# Patient Record
Sex: FEMALE | Race: WHITE | NOT HISPANIC OR LATINO | URBAN - METROPOLITAN AREA
[De-identification: names, ages, dates, MRNs, and addresses within clinical notes are randomized per-mention and may not be internally consistent; named-entity substitution may affect disease eponyms.]

---

## 2018-12-26 ENCOUNTER — EMERGENCY (EMERGENCY)
Facility: HOSPITAL | Age: 78
LOS: 1 days | Discharge: HOME | End: 2018-12-26
Attending: EMERGENCY MEDICINE | Admitting: EMERGENCY MEDICINE
Payer: COMMERCIAL

## 2018-12-26 VITALS
DIASTOLIC BLOOD PRESSURE: 95 MMHG | SYSTOLIC BLOOD PRESSURE: 150 MMHG | RESPIRATION RATE: 15 BRPM | HEART RATE: 81 BPM | HEIGHT: 66 IN | WEIGHT: 149.91 LBS | TEMPERATURE: 98 F | OXYGEN SATURATION: 97 %

## 2018-12-26 DIAGNOSIS — Z98.89 OTHER SPECIFIED POSTPROCEDURAL STATES: Chronic | ICD-10-CM

## 2018-12-26 PROCEDURE — 99284 EMERGENCY DEPT VISIT MOD MDM: CPT

## 2018-12-26 PROCEDURE — 72040 X-RAY EXAM NECK SPINE 2-3 VW: CPT | Mod: 26

## 2018-12-26 PROCEDURE — 96372 THER/PROPH/DIAG INJ SC/IM: CPT

## 2018-12-26 PROCEDURE — 72040 X-RAY EXAM NECK SPINE 2-3 VW: CPT

## 2018-12-26 PROCEDURE — 99284 EMERGENCY DEPT VISIT MOD MDM: CPT | Mod: 25

## 2018-12-26 PROCEDURE — 73130 X-RAY EXAM OF HAND: CPT

## 2018-12-26 PROCEDURE — 73130 X-RAY EXAM OF HAND: CPT | Mod: 26,LT

## 2018-12-26 RX ORDER — IBUPROFEN 200 MG
1 TABLET ORAL
Qty: 30 | Refills: 0
Start: 2018-12-26

## 2018-12-26 RX ORDER — DEXAMETHASONE 0.5 MG/5ML
8 ELIXIR ORAL ONCE
Refills: 0 | Status: COMPLETED | OUTPATIENT
Start: 2018-12-26 | End: 2018-12-26

## 2018-12-26 RX ORDER — KETOROLAC TROMETHAMINE 30 MG/ML
60 SYRINGE (ML) INJECTION ONCE
Refills: 0 | Status: DISCONTINUED | OUTPATIENT
Start: 2018-12-26 | End: 2018-12-26

## 2018-12-26 RX ORDER — DIAZEPAM 5 MG
5 TABLET ORAL ONCE
Refills: 0 | Status: DISCONTINUED | OUTPATIENT
Start: 2018-12-26 | End: 2018-12-26

## 2018-12-26 RX ORDER — DIAZEPAM 5 MG
1 TABLET ORAL
Qty: 10 | Refills: 0
Start: 2018-12-26

## 2018-12-26 RX ADMIN — Medication 60 MILLIGRAM(S): at 12:44

## 2018-12-26 RX ADMIN — Medication 5 MILLIGRAM(S): at 12:44

## 2018-12-26 RX ADMIN — Medication 8 MILLIGRAM(S): at 12:44

## 2018-12-26 NOTE — ED PROVIDER NOTE - NSFOLLOWUPINSTRUCTIONS_ED_ALL_ED_FT
REST  LIGHT ACTIVITY AS TOLERATED  FOLLOW UP WITH YOUR PRIMARY CARE DOCTOR FOR REFERRAL TO ORTHOPEDIST OR DR DOWLING ON CALL ORTHO  FOR PAIN USE IBUPROFEN  FOR SPASM USE VALIUM WITH CAUTION!!  FOR THE INFLAMMATION START MEDROL DOSE PACK **TOMORROW** YOU WERE GIVEN STEROIDS IN ER TODAY  RETURN FOR WORSENING SYMPTOMS OR ANY CONCERNS

## 2018-12-26 NOTE — ED PROVIDER NOTE - MEDICAL DECISION MAKING DETAILS
ro compression fx treat torticollis vs djd treat hand poss gout flare treat pain refer to pmd and orthopedist

## 2018-12-26 NOTE — ED ADULT TRIAGE NOTE - CHIEF COMPLAINT QUOTE
neck pain x 5 days, denies fever/chills. patient took "a muscle relaxer" and oxycodone that was a friends with minimal relief

## 2018-12-26 NOTE — ED ADULT NURSE NOTE - OBJECTIVE STATEMENT
Pt has neck pain after carrying heavy object on Friday. Meds given and tolerated well. Pt now in xray

## 2018-12-26 NOTE — ED PROVIDER NOTE - OBJECTIVE STATEMENT
Pt is a 77 yo f whose pmd is dr Raul Granado in [place] she has no sig pmhx, presents to emergency today with 5 days of progressive neck stiffness and pain after feeling a pop in r side of neck/shoulder while at work 5 days ago.  the pain has gotten worse not better with time, no neuro vasc complaints no cp no sob no numbness except for occasional pins and needles in fingers 3/4/5 r hand  she is a former smoker social drinker allergic to pcn bib son for eval.

## 2018-12-26 NOTE — ED ADULT NURSE NOTE - CHPI ED NUR SYMPTOMS NEG
no bleeding gums/no chills/no fever/no loss of consciousness/no nausea/no numbness/no syncope/no vomiting/no weakness

## 2018-12-26 NOTE — ED PROVIDER NOTE - CARE PROVIDER_API CALL
Darren Cespedes), Orthopaedic Surgery  [address] [place] [address]  Phone: [phone]  Fax: [phone]

## 2018-12-26 NOTE — ED PROVIDER NOTE - CONSTITUTIONAL, MLM
normal... Well appearing, well nourished, awake, alert, oriented to person, place, time/situation and does not turn head or move about without discomfort in neck

## 2018-12-26 NOTE — ED ADULT NURSE NOTE - NSIMPLEMENTINTERV_GEN_ALL_ED
Implemented All Universal Safety Interventions:  [place] to call system. Call bell, personal items and telephone within reach. Instruct patient to call for assistance. Room bathroom lighting operational. Non-slip footwear when patient is off stretcher. Physically safe environment: no spills, clutter or unnecessary equipment. Stretcher in lowest position, wheels locked, appropriate side rails in place.

## 2019-09-16 NOTE — ED ADULT NURSE NOTE - NSFALLRSKASSESSDT_ED_ALL_ED
PROCEDURAL PRE-SEDATION ASSESSMENT      Procedure date: 2019    Planned Procedure: Colonoscopy    Indications for Procedure: screening     Past Medical History:   Diagnosis Date   • Diverticulitis    • Hammer toes of both feet        Past Surgical History:   Procedure Laterality Date   •  section, low transverse  1986   • Toe surgery Bilateral     hammertoe of right and left 3rd digits       Current Facility-Administered Medications   Medication Dose Route Frequency Provider Last Rate Last Dose   • sodium chloride (PF) 0.9 % injection 2 mL  2 mL Intracatheter 2 times per day Rodolfo Rivers MD       • sodium chloride 0.9 % flush bag 25 mL  25 mL Intravenous PRN Rodolfo Rivers MD       • sodium chloride 0.9% infusion   Intravenous Continuous Rodolfo Rivers  mL/hr at 19 0857         ALLERGIES:  No Known Allergies    Social History     Tobacco Use   • Smoking status: Never Smoker   • Smokeless tobacco: Never Used   Substance Use Topics   • Alcohol use: Yes     Alcohol/week: 1.0 standard drinks     Types: 1 Standard drinks or equivalent per week   • Drug use: No         PHYSICAL EXAM  General: alert, oriented  Heart findings: normal  Lungs findings: normal  Abdomen findings: obese      OTHER FINDINGS  Reviewed current medications and allergies: YES  Reviewed pertinent lab/diagnostic tests: YES    RISK STATUS: ASA 2 Normal patient with mild systemic disease    AIRWAY ASSESSMENT: Mallampati Class I - Soft palate uvula fauces pillars visible.  No difficulty.    EKG Monitoring: YES    REASSESSMENT IMMEDIATELY PRIOR TO PROCEDURE:   Patient remains a candidate for the planned sedation and procedure. Risks, benefits and alternatives discussed with the patient, who seemed to understand and is in agreement. Written consent also obtained.      PLAN FOR SEDATION: IV Sedation    Assessing Physician: Rodolfo Rivers MD                             
26-Dec-2018 12:03

## 2020-10-10 DIAGNOSIS — Z01.818 ENCOUNTER FOR OTHER PREPROCEDURAL EXAMINATION: ICD-10-CM

## 2020-10-10 PROBLEM — Z00.00 ENCOUNTER FOR PREVENTIVE HEALTH EXAMINATION: Noted: 2020-10-10

## 2020-10-11 ENCOUNTER — APPOINTMENT (OUTPATIENT)
Dept: DISASTER EMERGENCY | Facility: CLINIC | Age: 80
End: 2020-10-11

## 2020-10-13 LAB — SARS-COV-2 N GENE NPH QL NAA+PROBE: NOT DETECTED

## 2020-11-14 ENCOUNTER — TRANSCRIPTION ENCOUNTER (OUTPATIENT)
Age: 80
End: 2020-11-14

## 2020-12-09 ENCOUNTER — TRANSCRIPTION ENCOUNTER (OUTPATIENT)
Age: 80
End: 2020-12-09

## 2021-02-03 ENCOUNTER — TRANSCRIPTION ENCOUNTER (OUTPATIENT)
Age: 81
End: 2021-02-03

## 2021-02-19 ENCOUNTER — TRANSCRIPTION ENCOUNTER (OUTPATIENT)
Age: 81
End: 2021-02-19

## 2021-11-06 ENCOUNTER — TRANSCRIPTION ENCOUNTER (OUTPATIENT)
Age: 81
End: 2021-11-06

## 2021-11-06 ENCOUNTER — APPOINTMENT (OUTPATIENT)
Dept: DISASTER EMERGENCY | Facility: CLINIC | Age: 81
End: 2021-11-06

## 2022-04-08 PROBLEM — Z00.00 ENCOUNTER FOR PREVENTIVE HEALTH EXAMINATION: Noted: 2022-04-08

## 2022-04-22 ENCOUNTER — APPOINTMENT (OUTPATIENT)
Dept: ORTHOPEDIC SURGERY | Facility: CLINIC | Age: 82
End: 2022-04-22

## 2022-04-22 ENCOUNTER — APPOINTMENT (OUTPATIENT)
Dept: ORTHOPEDIC SURGERY | Facility: CLINIC | Age: 82
End: 2022-04-22
Payer: MEDICARE

## 2022-04-22 VITALS — HEIGHT: 66 IN | WEIGHT: 153 LBS | BODY MASS INDEX: 24.59 KG/M2

## 2022-04-22 PROCEDURE — 99212 OFFICE O/P EST SF 10 MIN: CPT | Mod: 25

## 2022-04-22 PROCEDURE — 20611 DRAIN/INJ JOINT/BURSA W/US: CPT | Mod: RT

## 2022-04-22 NOTE — HISTORY OF PRESENT ILLNESS
[1] : 1 [Orthovisc] : Orthovisc [10] : 10 [8] : 8 [de-identified] : 12/27/21: 81 YEAR OLD FEMALE PRESENTS WITH LEFT SHOULDER PAIN AFTER A FALL ON 12/25/21. SHE TRIPPED OVER WRAPING PAPER AND FELL ONTO HER LEFT SHOULDER. SHE WENT TO [facility] AND WAS TOLD SHE HAD A FRACTURE.\par 1/24/22: F/U SHOUDLER, PAIN IMPROVING\par 2/21/22: FEELS BETTER\par 3/25/22: FOLLOW UP SHOULDER AND RIGHT KNEE PAIN. SHOULDER IS FEELING MUCH BETTER. HAS BEEN HAVING RIGHT KNEE PAIN FOR MANY MONTHS. SAW DR. RIZZO AND HAD MRI DONE SHOWING MEDIAL TIB PLATEAU STRESS FX. ALSO HAVING PAIN IN MEDIAL CALF.\par 4/1/22: FOLLOW UP RIGHT KNEE AND MRI REVIEW.\par 4/22/22: FOLLOW UP RIGHT KNEE, STARTING ORTHOVISC. [de-identified] : BRACE

## 2022-04-22 NOTE — ASSESSMENT
[FreeTextEntry1] : R/B/A ORTHOVISC REVIEWED\par ORTHOVISC #1 RIGHT KNEE TODAY, TOLERATED WELL\par RTO 1 WEEK TO CONTINUE\par \par The natural progression of Osteoarthritis was explained to the patient. We discussed the possible treatment options from conservative to operative. These included NSAIDS, Glucosamine and Chondrotin sulfate, and Physical Therapy as well different types of injections. We also discussed that at some point they may progress to needed a TKA. Information and pamphlets were given.\par \par I explained to the patient that OTC pain medication, ice, elevation, compression and rest would benefit them. They may return to activity after follow-up or when they no longer have any pain.\par \par The patient will ice, rest, and elevate the area to help with swelling.\par \par Patient is to begin over the counter oral anti-inflammatory medications on an as needed basis, as long as there are no medical contra-indications. Patient is counseled on possible GI and blood pressure side effects.\par \par

## 2022-04-22 NOTE — PROCEDURE
[FreeTextEntry3] : Large joint injection was performed of the RIGHT knee. The indication for this procedure was pain and inflammation. The site was prepped with alcohol, betadine, ethyl chloride sprayed topically and sterile technique used. An injection of ORTHOVISC, series #1 was used.\par \par Patient tolerated procedure well. Patient was advised to call if redness, pain of fever occur, apply ice for 15 minutes out of every hours for the next 12-24 hours as tolerated and patient was advised to rest the joint(s) for 2 days.\par \par Patient has tried OTC's including aspirin, ibuprofen, Aleve, etc or prescription NSAIDs, and/or exercises at home and/or physical therapy without satisfactory response, patient had decreased mobility in the joint and the risks, benefits and alternatives have been discussed, and verbal consent was obtained.\par \par Ultrasound guidance was indicated for this patient due to morbid obesity/difficult injection. All ultrasound images have been permanently captured and stored accordingly in our picture archiving and communication system. Visualization of the needle and placement of injection was performed without complication.\par

## 2022-04-22 NOTE — PHYSICAL EXAM
[Right] : right knee [] : lateral joint line tenderness [TWNoteComboBox7] : flexion 120 degrees [de-identified] : extension 0 degrees

## 2022-04-29 ENCOUNTER — APPOINTMENT (OUTPATIENT)
Dept: ORTHOPEDIC SURGERY | Facility: CLINIC | Age: 82
End: 2022-04-29
Payer: MEDICARE

## 2022-04-29 DIAGNOSIS — S82.131D DISPLACED FRACTURE OF MEDIAL CONDYLE OF RIGHT TIBIA, SUBSEQUENT ENCOUNTER FOR CLOSED FRACTURE WITH ROUTINE HEALING: ICD-10-CM

## 2022-04-29 PROCEDURE — 20610 DRAIN/INJ JOINT/BURSA W/O US: CPT | Mod: RT

## 2022-04-29 NOTE — HISTORY OF PRESENT ILLNESS
[10] : 10 [8] : 8 [1] : 1 [Orthovisc] : Orthovisc [de-identified] : 12/27/21: 81 YEAR OLD FEMALE PRESENTS WITH LEFT SHOULDER PAIN AFTER A FALL ON 12/25/21. SHE TRIPPED OVER WRAPING PAPER AND FELL ONTO HER LEFT SHOULDER. SHE WENT TO [facility] AND WAS TOLD SHE HAD A FRACTURE.\par 1/24/22: F/U SHOUDLER, PAIN IMPROVING\par 2/21/22: FEELS BETTER\par 3/25/22: FOLLOW UP SHOULDER AND RIGHT KNEE PAIN. SHOULDER IS FEELING MUCH BETTER. HAS BEEN HAVING RIGHT KNEE PAIN FOR MANY MONTHS. SAW DR. RIZZO AND HAD MRI DONE SHOWING MEDIAL TIB PLATEAU STRESS FX. ALSO HAVING PAIN IN MEDIAL CALF.\par 4/1/22: FOLLOW UP RIGHT KNEE AND MRI REVIEW.\par 4/22/22: FOLLOW UP RIGHT KNEE, STARTING ORTHOVISC.\par 4/29/22: FOLLOW UP RIGHT KNEE, ORTHOVISC #2 [de-identified] : BRACE

## 2022-04-29 NOTE — PROCEDURE
[FreeTextEntry3] : Large joint injection was performed of the RIGHT knee. The indication for this procedure was pain and inflammation. The site was prepped with alcohol, betadine, ethyl chloride sprayed topically and sterile technique used. An injection of ORTHOVISC, series #2 was used.\par \par Patient tolerated procedure well. Patient was advised to call if redness, pain of fever occur, apply ice for 15 minutes out of every hours for the next 12-24 hours as tolerated and patient was advised to rest the joint(s) for 2 days.\par \par Patient has tried OTC's including aspirin, ibuprofen, Aleve, etc or prescription NSAIDs, and/or exercises at home and/or physical therapy without satisfactory response, patient had decreased mobility in the joint and the risks, benefits and alternatives have been discussed, and verbal consent was obtained.\par

## 2022-04-29 NOTE — ASSESSMENT
[FreeTextEntry1] : R/B/A ORTHOVISC REVIEWED\par ORTHOVISC #2 RIGHT KNEE TODAY, TOLERATED WELL\par RTO 1 WEEK TO CONTINUE\par \par The natural progression of Osteoarthritis was explained to the patient. We discussed the possible treatment options from conservative to operative. These included NSAIDS, Glucosamine and Chondrotin sulfate, and Physical Therapy as well different types of injections. We also discussed that at some point they may progress to needed a TKA. Information and pamphlets were given.\par \par I explained to the patient that OTC pain medication, ice, elevation, compression and rest would benefit them. They may return to activity after follow-up or when they no longer have any pain.\par \par The patient will ice, rest, and elevate the area to help with swelling.\par \par Patient is to begin over the counter oral anti-inflammatory medications on an as needed basis, as long as there are no medical contra-indications. Patient is counseled on possible GI and blood pressure side effects.\par \par

## 2022-05-13 ENCOUNTER — APPOINTMENT (OUTPATIENT)
Dept: ORTHOPEDIC SURGERY | Facility: CLINIC | Age: 82
End: 2022-05-13
Payer: MEDICARE

## 2022-05-13 VITALS — WEIGHT: 153 LBS | BODY MASS INDEX: 24.59 KG/M2 | HEIGHT: 66 IN

## 2022-05-13 PROCEDURE — 20610 DRAIN/INJ JOINT/BURSA W/O US: CPT | Mod: RT

## 2022-05-13 NOTE — PROCEDURE
[FreeTextEntry3] : Large joint injection was performed of the RIGHT knee. The indication for this procedure was pain and inflammation. The site was prepped with alcohol, betadine, ethyl chloride sprayed topically and sterile technique used. An injection of ORTHOVISC, series #3 was used.\par \par Patient tolerated procedure well. Patient was advised to call if redness, pain of fever occur, apply ice for 15 minutes out of every hours for the next 12-24 hours as tolerated and patient was advised to rest the joint(s) for 2 days.\par \par Patient has tried OTC's including aspirin, ibuprofen, Aleve, etc or prescription NSAIDs, and/or exercises at home and/or physical therapy without satisfactory response, patient had decreased mobility in the joint and the risks, benefits and alternatives have been discussed, and verbal consent was obtained.\par

## 2022-05-13 NOTE — HISTORY OF PRESENT ILLNESS
[10] : 10 [8] : 8 [1] : 1 [Orthovisc] : Orthovisc [de-identified] : 12/27/21: 81 YEAR OLD FEMALE PRESENTS WITH LEFT SHOULDER PAIN AFTER A FALL ON 12/25/21. SHE TRIPPED OVER WRAPING PAPER AND FELL ONTO HER LEFT SHOULDER. SHE WENT TO [facility] AND WAS TOLD SHE HAD A FRACTURE.\par 1/24/22: F/U SHOUDLER, PAIN IMPROVING\par 2/21/22: FEELS BETTER\par 3/25/22: FOLLOW UP SHOULDER AND RIGHT KNEE PAIN. SHOULDER IS FEELING MUCH BETTER. HAS BEEN HAVING RIGHT KNEE PAIN FOR MANY MONTHS. SAW DR. RIZZO AND HAD MRI DONE SHOWING MEDIAL TIB PLATEAU STRESS FX. ALSO HAVING PAIN IN MEDIAL CALF.\par 4/1/22: FOLLOW UP RIGHT KNEE AND MRI REVIEW.\par 4/22/22: FOLLOW UP RIGHT KNEE, STARTING ORTHOVISC.\par 4/29/22: FOLLOW UP RIGHT KNEE, ORTHOVISC #2\par 5/13/22: FOLLOW UP RIGHT KNEE, ORTHOVISC #3 [de-identified] : BRACE

## 2022-06-03 ENCOUNTER — APPOINTMENT (OUTPATIENT)
Dept: ORTHOPEDIC SURGERY | Facility: CLINIC | Age: 82
End: 2022-06-03
Payer: MEDICARE

## 2022-06-03 PROCEDURE — 20610 DRAIN/INJ JOINT/BURSA W/O US: CPT

## 2022-06-03 PROCEDURE — 99024 POSTOP FOLLOW-UP VISIT: CPT | Mod: NC

## 2022-06-03 NOTE — PROCEDURE
[FreeTextEntry3] : Large joint injection was performed of the RIGHT knee. The indication for this procedure was pain and inflammation. The site was prepped with alcohol, betadine, ethyl chloride sprayed topically and sterile technique used. An injection of ORTHOVISC, series #4 was used.\par \par Patient tolerated procedure well. Patient was advised to call if redness, pain of fever occur, apply ice for 15 minutes out of every hours for the next 12-24 hours as tolerated and patient was advised to rest the joint(s) for 2 days.\par \par Patient has tried OTC's including aspirin, ibuprofen, Aleve, etc or prescription NSAIDs, and/or exercises at home and/or physical therapy without satisfactory response, patient had decreased mobility in the joint and the risks, benefits and alternatives have been discussed, and verbal consent was obtained.\par

## 2022-06-03 NOTE — HISTORY OF PRESENT ILLNESS
[10] : 10 [8] : 8 [1] : 1 [Orthovisc] : Orthovisc [de-identified] : 12/27/21: 81 YEAR OLD FEMALE PRESENTS WITH LEFT SHOULDER PAIN AFTER A FALL ON 12/25/21. SHE TRIPPED OVER WRAPING PAPER AND FELL ONTO HER LEFT SHOULDER. SHE WENT TO [facility] AND WAS TOLD SHE HAD A FRACTURE.\par 1/24/22: F/U SHOUDLER, PAIN IMPROVING\par 2/21/22: FEELS BETTER\par 3/25/22: FOLLOW UP SHOULDER AND RIGHT KNEE PAIN. SHOULDER IS FEELING MUCH BETTER. HAS BEEN HAVING RIGHT KNEE PAIN FOR MANY MONTHS. SAW DR. RIZZO AND HAD MRI DONE SHOWING MEDIAL TIB PLATEAU STRESS FX. ALSO HAVING PAIN IN MEDIAL CALF.\par 4/1/22: FOLLOW UP RIGHT KNEE AND MRI REVIEW.\par 4/22/22: FOLLOW UP RIGHT KNEE, STARTING ORTHOVISC.\par 4/29/22: FOLLOW UP RIGHT KNEE, ORTHOVISC #2\par 5/13/22: FOLLOW UP RIGHT KNEE, ORTHOVISC #3\par 6/3/22: FOLLOW UP RIGHT KNEE, ORTHOVISC #4. NO RELIEF OF SYMPTOMS, CONTINUES TO HAVE MEDIAL PAIN. [de-identified] : BRACE

## 2022-06-03 NOTE — ASSESSMENT
[FreeTextEntry1] : R/B/A ORTHOVISC REVIEWED\par ORTHOVISC #4 RIGHT KNEE TODAY, TOLERATED WELL\par DISCUSSED POSSIBLE SCOPE IF SHE REMAINS SYMPTOMATIC, UNDERSTANDS THIS MAY NOT PROVIDE ADEQUATE RELIEF DUE TO DEGREE OF OA\par RTO 4 WEEKS\par \par The natural progression of Osteoarthritis was explained to the patient. We discussed the possible treatment options from conservative to operative. These included NSAIDS, Glucosamine and Chondrotin sulfate, and Physical Therapy as well different types of injections. We also discussed that at some point they may progress to needed a TKA. Information and pamphlets were given.\par \par I explained to the patient that OTC pain medication, ice, elevation, compression and rest would benefit them. They may return to activity after follow-up or when they no longer have any pain.\par \par The patient will ice, rest, and elevate the area to help with swelling.\par \par Patient is to begin over the counter oral anti-inflammatory medications on an as needed basis, as long as there are no medical contra-indications. Patient is counseled on possible GI and blood pressure side effects.\par \par

## 2022-06-15 ENCOUNTER — APPOINTMENT (OUTPATIENT)
Dept: PAIN MANAGEMENT | Facility: CLINIC | Age: 82
End: 2022-06-15

## 2022-06-24 ENCOUNTER — APPOINTMENT (OUTPATIENT)
Dept: ORTHOPEDIC SURGERY | Facility: CLINIC | Age: 82
End: 2022-06-24
Payer: MEDICARE

## 2022-06-24 VITALS — HEIGHT: 66 IN | BODY MASS INDEX: 24.11 KG/M2 | WEIGHT: 150 LBS

## 2022-06-24 PROCEDURE — 99213 OFFICE O/P EST LOW 20 MIN: CPT | Mod: 25

## 2022-06-24 PROCEDURE — 20605 DRAIN/INJ JOINT/BURSA W/O US: CPT

## 2022-06-24 NOTE — PROCEDURE
[Small Joint Injection] : small joint injection [Right] : of the right [4th] : 4th [Proximal interphalangeal joint] : proximal interphalangeal joint [Pain] : pain [Inflammation] : inflammation [Alcohol] : alcohol [Ethyl Chloride sprayed topically] : ethyl chloride sprayed topically [Sterile technique used] : sterile technique used [___ cc    1%] : Lidocaine ~Vcc of 1%  [___ cc    10mg] : Triamcinolone (Kenalog) ~Vcc of 10 mg

## 2022-06-24 NOTE — HISTORY OF PRESENT ILLNESS
[Gradual] : gradual [8] : 8 [7] : 7 [Localized] : localized [Constant] : constant [Household chores] : household chores [Injection therapy] : injection therapy [de-identified] : pt presents here today with right hand pain for years\par pt has try cortisone injections in the past with good relief \par swelling \par \par 82 year old female presents with RIGHT ring finger pain and swelling. no injury truama.  [] : no [FreeTextEntry1] : right hand  [FreeTextEntry5] : no injury  [de-identified] : lifting things  [de-identified] : Dr Gonzalez  [de-identified] : cortisone injections

## 2022-06-28 ENCOUNTER — APPOINTMENT (OUTPATIENT)
Dept: PAIN MANAGEMENT | Facility: CLINIC | Age: 82
End: 2022-06-28
Payer: MEDICARE

## 2022-06-28 VITALS — HEIGHT: 66 IN | WEIGHT: 150 LBS | BODY MASS INDEX: 24.11 KG/M2

## 2022-06-28 PROCEDURE — 99214 OFFICE O/P EST MOD 30 MIN: CPT

## 2022-06-29 NOTE — PHYSICAL EXAM
[] : full ROM with mild stiffness [Extension] : extension [de-identified] : Constitutional: \par - No acute distress \par - Well developed; well nourished \par \par Neurological: \par - normal mood and affect \par - alert and oriented x 3  \par \par Cardiovascular: \par - grossly normal\par \par Lumbar Spine Exam: \par \par Inspection: \par erythema (-) \par ecchymosis (-) \par rashes (-) \par alignment: no scoliosis\par \par Palpation:  \par paraspinal tenderness:                  L (-) ; R (-) \par thoracic paraspinal tenderness:    L (-) ; R (-) \par sciatic nerve tenderness :             L (-) ; R (+) \par SI joint tenderness:                        L (-) ; R (-) \par GTB tenderness:                            L (-);  R (-) \par \par ROM:   \par Full ROM with mild stiffness \par Pain with extension \par \par Strength:                   Right       Left    \par Hip Flexion:                (5/5)       (5/5) \par Quadriceps:               (5/5)       (5/5) \par Hamstrings:                (5/5)       (5/5) \par Ankle Dorsiflexion:     (5/5)       (5/5) \par EHL:                            (5/5)       (5/5) \par Ankle Plantarflexion:  (5/5)       (5/5) \par \par \par Special Tests: \par SLR:                      R (+) ; L (-) \par Facet loading:       R (+) ; L (+) \par GERMAINE test:          R (-) ; L (-) \par XSLR:                   R (-) ; L (-) \par Luis's test:        R (-) ; L (-) \par Tight Hamstrings   R (+) ; L (+) \par \par Neurologic: \par Light touch intact throughout LE \par Reflexes normal and symmetric \par \par Gait: \par mildly antalgic

## 2022-06-29 NOTE — ASSESSMENT
[FreeTextEntry1] : A thorough discussion occurred regarding available pain management treatment options including interventional, rehabilitative, pharmacological, and alternative modalities with the patient. We will proceed with the following:\par \par Interventional treatment options:\par - Would proceed with right paramedian L4-5 LESI with fluoroscopic guidance \par - can consider lumbar facet directed intervention if inadequate relief of axial low back pain \par - patient wishes to address cervical spine once adequate relief \par - see additional instructions below\par \par Rehabilitative options:\par - Patient will continue HEP as tolerated\par - Previously provided home exercise sheet\par - see additional instructions below\par \par Medication based treatment options:\par - continue Tylenol 500-1000 mg TID PRN, Meloxicam 15 mg daily PRN and Robaxin 500 mg BID PRN spasm \par - see additional instructions below\par \par Complementary treatment options:\par - Weight management and lifestyle modifications discussed\par - patient will consider acupuncture in the future if nonsustained relief \par - See additional instructions below\par \par Additional treatment recommendations as follows:\par - Follow up 1-2 weeks post injection for assessment of efficacy and further recommendations.\par \par The risks, benefits and alternatives of the proposed procedure were explained in detail with the patient. The risks outlined include but are not limited to infection, bleeding, post- dural puncture headache, nerve injury, a temporary increase in pain, failure to resolve symptoms, allergic reaction, and possible elevation of blood sugar in diabetics if using corticosteroid.  All questions were answered to patient's apparent satisfaction and he/she verbalized an understanding.\par \par The documentation recorded by the scribe, in my presence, accurately reflects the service I personally performed and the decisions made by me with my edits as appropriate.\par \par I, Jatin Campbell acting as scribe, attest that this documentation has been prepared under the direction and in the presence of Provider Andreas Albright DO.

## 2022-06-29 NOTE — HISTORY OF PRESENT ILLNESS
[Neck] : neck [Lower back] : lower back [Buttock] : buttock [Left Leg] : left leg [Right Leg] : right leg [6] : 6 [Dull/Aching] : dull/aching [Localized] : localized [Intermittent] : intermittent [Leisure] : leisure [Social interactions] : social interactions [Meds] : meds [Walking] : walking [FreeTextEntry1] : 06/28/22 - Patient presents for a FUV. Last seen 6 months ago.  Patient c/o low back pain radiating to the bilateral buttocks and to the RLE. She reports benefit with ROSALEE's in the past. Patient reports no observable benefit with knee gel injections x 4 with Dr. Glasgow. She reports some weakness and paraesthesia to the RLE. \par \par 11/30/21 - Patient presents for a FUV following a Left Paramedian L4-5 LESI on 11/10/21. Patient reports her radicular left leg pain was completely resolved following her last LESI. She is pleased with the results of her injection. \par \par 11/02/21 - Patient presents for a FUV. Patient reports that she has had an increase of lower back pain radiating into her left hip which began two weeks ago. Patient reports that prior to two weeks ago she didn't experience this pain since her previous ROSALEE. Patient reports that this acute flare up feels worse than her previous episodes. Patient has been taking meloxicam without significant relief.\par \par 8/6/21 - FUV after L4-L5 LESI on 7/23/21. She reports 75% improvement of her low back and right radicular leg pain. She has increased her activity level with reduction of pain. She is pleased with response to ROSALEE. No new complaints. \par \par 10/22/2020 - FUV for imaging review regarding her low back pain. Feels her low back pain has improved since initial fall until this morning. Feels increased stiffness in the morning which improves as the day continues. MRI reviewed in detail. \par \par 10/8/2020 - Patient complains of new injury. She reports fall 1 week ago down her stairs on her buttocks. No pain initially but for several days following developed increasing pain in low back into right buttock and posterior thigh. Was put on MDP for different issue which improved some of her back and leg pain but is fearful pain will return now that she completed oral steroids. No change in pain when sitting/standing/laying down. \par \par Injections: \par 1) Right paramedian C7-T1 ABIOLA (11/13/19)\par 2) Right C3-C4, C4-C5, C5-C6 RFA (11/19/20)\par 3) Left PM L4-L5 LESI (1/28/21, 2/24/21, 7/23/21, 11/10/21)\par \par Pertinent Surgical History: N/A\par \par Imaging:\par MRI Lumbar Spine (10/14/2020) - O/C\par \par T12-L1: Bulge and facet hypertrophy with foraminal stenosis. No herniation or central stenosis.\par L1-L2: Bulge and facet hypertrophy with foraminal stenosis. No herniation or central stenosis.\par L2-L3: Minor retrolisthesis. Bulge, spondylotic ridge, and facet hypertrophy with foraminal stenosis. Mild central stenosis. No central herniation.\par L3-L4: Minor retrolisthesis. Bulge, spondylotic ridge, and facet hypertrophy with foraminal stenosis and impingement on the exiting nerve roots. Mild central stenosis.\par L4-L5: Grade 1 anterior spondylolisthesis. No spondylolysis. Bulge, spondylotic ridge, and facet hypertrophy with foraminal stenosis and impingement on the exiting nerve roots. Mild-to-moderate central stenosis. No central herniation.\par L5-S1: Vacuum disc. Grade 1 anterior spondylolisthesis. No spondylolysis. Bulge and facet hypertrophy with foraminal stenosis and impingement on the exiting nerve roots. Mild-to-moderate central stenosis. No central herniation.\par \par Physician Disclaimer: I have personally reviewed and confirmed all HPI data with the patient. [] : no

## 2022-07-01 ENCOUNTER — APPOINTMENT (OUTPATIENT)
Dept: ORTHOPEDIC SURGERY | Facility: CLINIC | Age: 82
End: 2022-07-01

## 2022-07-01 VITALS — HEIGHT: 66 IN | WEIGHT: 150 LBS | BODY MASS INDEX: 24.11 KG/M2

## 2022-07-01 PROCEDURE — 99213 OFFICE O/P EST LOW 20 MIN: CPT

## 2022-07-01 NOTE — ASSESSMENT
[FreeTextEntry1] : WILL WAIT TO SEE HOW MUCH RELIEF SHE GETS FROM LESI\par I DONT LISSETTE SCOPE WOULD BE OF MUCH BENEFOT GIVEN THE OA BUT MAY NEED AS LAST RESORT\par LAST MRI IN MARCH 2022 SHOWINF MODERATE OA\par DISUCSSED ZILRETTA OR REPEAT VISCO IN 6 MONTHS\par \par

## 2022-07-01 NOTE — IMAGING
[de-identified] : Right knee Varus deformity\par Mild effusion, no warmth, no ecchymosis\par Medial joint line tenderness to palpation \par Range of motion 0-120 with associated crepitus\par 5/5 quadriceps and hamstring strength\par Ligamentously stable\par Motor and sensory intact distally\par Mildly antalgic gait\par Negative Daly\par

## 2022-07-01 NOTE — HISTORY OF PRESENT ILLNESS
[de-identified] : 12/27/21: 81 YEAR OLD FEMALE PRESENTS WITH LEFT SHOULDER PAIN AFTER A FALL ON 12/25/21. SHE TRIPPED OVER WRAPING PAPER AND FELL ONTO HER LEFT SHOULDER. SHE WENT TO [facility] AND WAS TOLD SHE HAD A FRACTURE.\par 1/24/22: F/U SHOUDLER, PAIN IMPROVING\par 2/21/22: FEELS BETTER\par 3/25/22: FOLLOW UP SHOULDER AND RIGHT KNEE PAIN. SHOULDER IS FEELING MUCH BETTER. HAS BEEN HAVING RIGHT KNEE PAIN FOR MANY MONTHS. SAW DR. RIZZO AND HAD MRI DONE SHOWING MEDIAL TIB PLATEAU STRESS FX. ALSO HAVING PAIN IN MEDIAL CALF.\par 4/1/22: FOLLOW UP RIGHT KNEE AND MRI REVIEW.\par 4/22/22: FOLLOW UP RIGHT KNEE, STARTING ORTHOVISC.\par 4/29/22: FOLLOW UP RIGHT KNEE, ORTHOVISC #2\par 5/13/22: FOLLOW UP RIGHT KNEE, ORTHOVISC #3\par 6/3/22: FOLLOW UP RIGHT KNEE, ORTHOVISC #4. NO RELIEF OF SYMPTOMS, CONTINUES TO HAVE MEDIAL PAIN.\par 7/1/22: SOME RELIEF AFTER VISCO BUT PAIN PERSISTS

## 2022-07-11 ENCOUNTER — NON-APPOINTMENT (OUTPATIENT)
Age: 82
End: 2022-07-11

## 2022-07-12 ENCOUNTER — APPOINTMENT (OUTPATIENT)
Dept: PAIN MANAGEMENT | Facility: CLINIC | Age: 82
End: 2022-07-12

## 2022-07-13 ENCOUNTER — APPOINTMENT (OUTPATIENT)
Dept: PAIN MANAGEMENT | Facility: CLINIC | Age: 82
End: 2022-07-13

## 2022-07-13 PROCEDURE — 62323 NJX INTERLAMINAR LMBR/SAC: CPT

## 2022-07-13 NOTE — PROCEDURE
[FreeTextEntry3] : Date of Service: 07/13/2022 \par \par Account: [id number]\par \par Patient: JOHANNE JOSHI \par \par YOB: 1940\par \par Age: 82 year\par \par Surgeon:      Andreas Albright DO\par \par Assistant:    None\par \par Pre-Operative Diagnosis:         Lumbosacral Radiculitis (M54.17)\par \par Post Operative Diagnosis:       Lumbosacral Radiculitis (M54.17)   \par \par Procedure:             Right paramedian (L4-5) epidural steroid injection under fluoroscopic guidance\par \par Anesthesia:           MAC\par \par This procedure was carried out using fluoroscopic guidance.  The risks and benefits of the procedure were discussed extensively with the patient.  The consent of the patient was obtained and the following procedure was performed.  A timeout was performed with all essential staff present and the site and side were verified.\par \par The patient was placed in the prone position with a pillow under the abdomen to minimize the lumbar lordosis.  The lumbar area was prepped and draped in a sterile fashion.  Under A/P view with slight cephalad-caudad angulation, the L4-5 interspace was identified and marked.  Using sterile technique the superficial skin was anesthetized with 1% Lidocaine.  A 20 gauge Tuohy needle was advanced into the epidural space under fluoroscopy using loss of resistance at the L4-5 level.  After negative aspiration for heme or CSF, an epidurogram was obtained in the A/P and lateral fluoroscopic views using 2-3 cc of Omnipaque contrast confirming epidural placement of the needle.  After this, 5 cc of of a mixture of preservative free normal saline and 80 mg of Kenalog were injected into the epidural space. \par \par The needle was subsequently removed.  Vital signs remained normal.  Pulse oximeter was used throughout the procedure and the patient's pulse and oxygen saturation remained within normal limits.  The patient tolerated the procedure well.  There were no complications.  The patient was instructed to apply ice over the injection sites for twenty minutes every two hours for the next 24 to 48 hours.\par \par Disposition:\par      1. The patient was advised to F/U in 1-2 weeks to assess the response to the injection.\par      2. The patient was also instructed to contact me immediately if there were any concerns related to the procedure performed.

## 2022-08-16 ENCOUNTER — APPOINTMENT (OUTPATIENT)
Dept: PAIN MANAGEMENT | Facility: CLINIC | Age: 82
End: 2022-08-16

## 2022-08-16 VITALS — HEIGHT: 66 IN | WEIGHT: 150 LBS | BODY MASS INDEX: 24.11 KG/M2

## 2022-08-16 PROCEDURE — 99213 OFFICE O/P EST LOW 20 MIN: CPT

## 2022-08-16 NOTE — REASON FOR VISIT
[Follow-Up Visit] : a follow-up pain management visit [FreeTextEntry2] : neck, back and hips and follow up to injection

## 2022-08-16 NOTE — HISTORY OF PRESENT ILLNESS
[Neck] : neck [Lower back] : lower back [Buttock] : buttock [Left Leg] : left leg [Right Leg] : right leg [6] : 6 [Dull/Aching] : dull/aching [Localized] : localized [Intermittent] : intermittent [Leisure] : leisure [Social interactions] : social interactions [Meds] : meds [Walking] : walking [FreeTextEntry1] : 08/16/22 - Patient presents for a FUV following an L4-5 LESI on 07/13/22. Patient reports experiencing 3 day pain flare-up which has resolved. She reports benefit following the injection. Pain is at an acceptable level. \par \par 06/28/22 - Patient presents for a FUV. Last seen 6 months ago.  Patient c/o low back pain radiating to the bilateral buttocks and to the RLE. She reports benefit with ROSALEE's in the past. Patient reports no observable benefit with knee gel injections x 4 with Dr. Glasgow. She reports some weakness and paraesthesia to the RLE. \par \par 11/30/21 - Patient presents for a FUV following a Left Paramedian L4-5 LESI on 11/10/21. Patient reports her radicular left leg pain was completely resolved following her last LESI. She is pleased with the results of her injection. \par \par 11/02/21 - Patient presents for a FUV. Patient reports that she has had an increase of lower back pain radiating into her left hip which began two weeks ago. Patient reports that prior to two weeks ago she didn't experience this pain since her previous ROSALEE. Patient reports that this acute flare up feels worse than her previous episodes. Patient has been taking meloxicam without significant relief.\par \par 8/6/21 - FUV after L4-L5 LESI on 7/23/21. She reports 75% improvement of her low back and right radicular leg pain. She has increased her activity level with reduction of pain. She is pleased with response to ROSALEE. No new complaints. \par \par 10/22/2020 - FUV for imaging review regarding her low back pain. Feels her low back pain has improved since initial fall until this morning. Feels increased stiffness in the morning which improves as the day continues. MRI reviewed in detail. \par \par 10/8/2020 - Patient complains of new injury. She reports fall 1 week ago down her stairs on her buttocks. No pain initially but for several days following developed increasing pain in low back into right buttock and posterior thigh. Was put on MDP for different issue which improved some of her back and leg pain but is fearful pain will return now that she completed oral steroids. No change in pain when sitting/standing/laying down. \par \par Injections: \par 1) Right paramedian C7-T1 ABIOLA (11/13/19)\par 2) Right C3-C4, C4-C5, C5-C6 RFA (11/19/20)\par 3) Left PM L4-L5 LESI (1/28/21, 2/24/21, 7/23/21, 11/10/21)\par 4) Right PM L4-5 LESI (07/13/22) \par \par Pertinent Surgical History: N/A\par \par Imaging:\par MRI Lumbar Spine (10/14/2020) - O/C\par \par T12-L1: Bulge and facet hypertrophy with foraminal stenosis. No herniation or central stenosis.\par L1-L2: Bulge and facet hypertrophy with foraminal stenosis. No herniation or central stenosis.\par L2-L3: Minor retrolisthesis. Bulge, spondylotic ridge, and facet hypertrophy with foraminal stenosis. Mild central stenosis. No central herniation.\par L3-L4: Minor retrolisthesis. Bulge, spondylotic ridge, and facet hypertrophy with foraminal stenosis and impingement on the exiting nerve roots. Mild central stenosis.\par L4-L5: Grade 1 anterior spondylolisthesis. No spondylolysis. Bulge, spondylotic ridge, and facet hypertrophy with foraminal stenosis and impingement on the exiting nerve roots. Mild-to-moderate central stenosis. No central herniation.\par L5-S1: Vacuum disc. Grade 1 anterior spondylolisthesis. No spondylolysis. Bulge and facet hypertrophy with foraminal stenosis and impingement on the exiting nerve roots. Mild-to-moderate central stenosis. No central herniation.\par \par Physician Disclaimer: I have personally reviewed and confirmed all HPI data with the patient. [] : no

## 2022-08-16 NOTE — PHYSICAL EXAM
[] : full ROM with mild stiffness [Extension] : extension [de-identified] : Constitutional: \par - No acute distress \par - Well developed; well nourished \par \par Neurological: \par - normal mood and affect \par - alert and oriented x 3  \par \par Cardiovascular: \par - grossly normal\par \par Lumbar Spine Exam: \par \par Inspection: \par erythema (-) \par ecchymosis (-) \par rashes (-) \par alignment: no scoliosis\par \par Palpation:  \par paraspinal tenderness:                  L (-) ; R (-) \par thoracic paraspinal tenderness:    L (-) ; R (-) \par sciatic nerve tenderness :             L (-) ; R (+) \par SI joint tenderness:                        L (-) ; R (-) \par GTB tenderness:                            L (-);  R (-) \par \par ROM:   \par Full ROM with mild stiffness \par Pain with extension \par \par Strength:                   Right       Left    \par Hip Flexion:                (5/5)       (5/5) \par Quadriceps:               (5/5)       (5/5) \par Hamstrings:                (5/5)       (5/5) \par Ankle Dorsiflexion:     (5/5)       (5/5) \par EHL:                            (5/5)       (5/5) \par Ankle Plantarflexion:  (5/5)       (5/5) \par \par \par Special Tests: \par SLR:                      R (+) ; L (-) \par Facet loading:       R (+) ; L (+) \par GERMAINE test:          R (-) ; L (-) \par XSLR:                   R (-) ; L (-) \par Luis's test:        R (-) ; L (-) \par Tight Hamstrings   R (+) ; L (+) \par \par Neurologic: \par Light touch intact throughout LE \par Reflexes normal and symmetric \par \par Gait: \par mildly antalgic

## 2022-08-16 NOTE — ASSESSMENT
[FreeTextEntry1] : A thorough discussion occurred regarding available pain management treatment options including interventional, rehabilitative, pharmacological, and alternative modalities with the patient. We will proceed with the following:\par \par Interventional treatment options:\par - None indicated at present time \par - can repeat right paramedian L4-5 LESI with fluoroscopic guidance with return of severe radicular pain \par - can consider lumbar facet directed intervention if inadequate relief of axial low back pain \par - see additional instructions below\par \par Rehabilitative options:\par - Patient will continue HEP as tolerated\par - Previously provided home exercise sheet\par - see additional instructions below \par \par Medication based treatment options:\par - continue Tylenol 500-1000 mg TID PRN mild-moderate pain\par - continue Meloxicam 15 mg daily PRN moderate-severe pain\par - Robaxin 500 mg BID PRN spasm \par - see additional instructions below\par \par Complementary treatment options:\par - Weight management and lifestyle modifications discussed\par - patient will consider acupuncture in the future if nonsustained relief\par \par Additional treatment recommendations as follows:\par - Follow up PRN basis\par \par We have discussed the risks, benefits, and alternatives NSAID therapy including but not limited to the risk of bleeding, thrombosis, gastric mucosal irritation/ulceration, allergic reaction and kidney dysfunction; the patient verbalizes an understanding.\par \par The documentation recorded by the scribe, in my presence, accurately reflects the service I personally performed and the decisions made by me with my edits as appropriate.\par \par I, Jatin Campbell acting as scribe, attest that this documentation has been prepared under the direction and in the presence of Provider Andreas Albright DO.

## 2022-11-08 ENCOUNTER — EMERGENCY (EMERGENCY)
Facility: HOSPITAL | Age: 82
LOS: 1 days | Discharge: HOME | End: 2022-11-08
Attending: EMERGENCY MEDICINE | Admitting: EMERGENCY MEDICINE
Payer: MEDICARE

## 2022-11-08 VITALS
HEART RATE: 73 BPM | TEMPERATURE: 98 F | WEIGHT: 149.91 LBS | HEIGHT: 66 IN | RESPIRATION RATE: 17 BRPM | OXYGEN SATURATION: 99 % | DIASTOLIC BLOOD PRESSURE: 82 MMHG | SYSTOLIC BLOOD PRESSURE: 169 MMHG

## 2022-11-08 VITALS
TEMPERATURE: 98 F | DIASTOLIC BLOOD PRESSURE: 79 MMHG | SYSTOLIC BLOOD PRESSURE: 160 MMHG | RESPIRATION RATE: 16 BRPM | HEART RATE: 71 BPM | OXYGEN SATURATION: 99 %

## 2022-11-08 DIAGNOSIS — Z98.89 OTHER SPECIFIED POSTPROCEDURAL STATES: Chronic | ICD-10-CM

## 2022-11-08 LAB
ALBUMIN SERPL ELPH-MCNC: 3.8 G/DL — SIGNIFICANT CHANGE UP (ref 3.3–5)
ALP SERPL-CCNC: 83 U/L — SIGNIFICANT CHANGE UP (ref 40–120)
ALT FLD-CCNC: 37 U/L — SIGNIFICANT CHANGE UP (ref 12–78)
ANION GAP SERPL CALC-SCNC: 4 MMOL/L — LOW (ref 5–17)
APPEARANCE UR: CLEAR — SIGNIFICANT CHANGE UP
AST SERPL-CCNC: 26 U/L — SIGNIFICANT CHANGE UP (ref 15–37)
BASOPHILS # BLD AUTO: 0.04 K/UL — SIGNIFICANT CHANGE UP (ref 0–0.2)
BASOPHILS NFR BLD AUTO: 0.5 % — SIGNIFICANT CHANGE UP (ref 0–2)
BILIRUB SERPL-MCNC: 0.5 MG/DL — SIGNIFICANT CHANGE UP (ref 0.2–1.2)
BILIRUB UR-MCNC: NEGATIVE — SIGNIFICANT CHANGE UP
BUN SERPL-MCNC: 15 MG/DL — SIGNIFICANT CHANGE UP (ref 7–23)
CALCIUM SERPL-MCNC: 9.1 MG/DL — SIGNIFICANT CHANGE UP (ref 8.5–10.1)
CHLORIDE SERPL-SCNC: 109 MMOL/L — HIGH (ref 96–108)
CO2 SERPL-SCNC: 28 MMOL/L — SIGNIFICANT CHANGE UP (ref 22–31)
COLOR SPEC: SIGNIFICANT CHANGE UP
CREAT SERPL-MCNC: 0.81 MG/DL — SIGNIFICANT CHANGE UP (ref 0.5–1.3)
DIFF PNL FLD: NEGATIVE — SIGNIFICANT CHANGE UP
EGFR: 73 ML/MIN/1.73M2 — SIGNIFICANT CHANGE UP
EOSINOPHIL # BLD AUTO: 0.14 K/UL — SIGNIFICANT CHANGE UP (ref 0–0.5)
EOSINOPHIL NFR BLD AUTO: 1.9 % — SIGNIFICANT CHANGE UP (ref 0–6)
GLUCOSE SERPL-MCNC: 92 MG/DL — SIGNIFICANT CHANGE UP (ref 70–99)
GLUCOSE UR QL: NEGATIVE — SIGNIFICANT CHANGE UP
HCT VFR BLD CALC: 39.5 % — SIGNIFICANT CHANGE UP (ref 34.5–45)
HGB BLD-MCNC: 13.2 G/DL — SIGNIFICANT CHANGE UP (ref 11.5–15.5)
IMM GRANULOCYTES NFR BLD AUTO: 0.4 % — SIGNIFICANT CHANGE UP (ref 0–0.9)
KETONES UR-MCNC: NEGATIVE — SIGNIFICANT CHANGE UP
LEUKOCYTE ESTERASE UR-ACNC: NEGATIVE — SIGNIFICANT CHANGE UP
LIDOCAIN IGE QN: 2062 U/L — HIGH (ref 73–393)
LYMPHOCYTES # BLD AUTO: 1.49 K/UL — SIGNIFICANT CHANGE UP (ref 1–3.3)
LYMPHOCYTES # BLD AUTO: 20 % — SIGNIFICANT CHANGE UP (ref 13–44)
MCHC RBC-ENTMCNC: 31.4 PG — SIGNIFICANT CHANGE UP (ref 27–34)
MCHC RBC-ENTMCNC: 33.4 GM/DL — SIGNIFICANT CHANGE UP (ref 32–36)
MCV RBC AUTO: 93.8 FL — SIGNIFICANT CHANGE UP (ref 80–100)
MONOCYTES # BLD AUTO: 0.66 K/UL — SIGNIFICANT CHANGE UP (ref 0–0.9)
MONOCYTES NFR BLD AUTO: 8.9 % — SIGNIFICANT CHANGE UP (ref 2–14)
NEUTROPHILS # BLD AUTO: 5.08 K/UL — SIGNIFICANT CHANGE UP (ref 1.8–7.4)
NEUTROPHILS NFR BLD AUTO: 68.3 % — SIGNIFICANT CHANGE UP (ref 43–77)
NITRITE UR-MCNC: NEGATIVE — SIGNIFICANT CHANGE UP
NRBC # BLD: 0 /100 WBCS — SIGNIFICANT CHANGE UP (ref 0–0)
PH UR: 6 — SIGNIFICANT CHANGE UP (ref 5–8)
PLATELET # BLD AUTO: 233 K/UL — SIGNIFICANT CHANGE UP (ref 150–400)
POTASSIUM SERPL-MCNC: 4.4 MMOL/L — SIGNIFICANT CHANGE UP (ref 3.5–5.3)
POTASSIUM SERPL-SCNC: 4.4 MMOL/L — SIGNIFICANT CHANGE UP (ref 3.5–5.3)
PROT SERPL-MCNC: 7.2 G/DL — SIGNIFICANT CHANGE UP (ref 6–8.3)
PROT UR-MCNC: NEGATIVE — SIGNIFICANT CHANGE UP
RBC # BLD: 4.21 M/UL — SIGNIFICANT CHANGE UP (ref 3.8–5.2)
RBC # FLD: 12.5 % — SIGNIFICANT CHANGE UP (ref 10.3–14.5)
SARS-COV-2 RNA SPEC QL NAA+PROBE: SIGNIFICANT CHANGE UP
SODIUM SERPL-SCNC: 141 MMOL/L — SIGNIFICANT CHANGE UP (ref 135–145)
SP GR SPEC: 1.01 — SIGNIFICANT CHANGE UP (ref 1.01–1.02)
UROBILINOGEN FLD QL: NEGATIVE — SIGNIFICANT CHANGE UP
WBC # BLD: 7.44 K/UL — SIGNIFICANT CHANGE UP (ref 3.8–10.5)
WBC # FLD AUTO: 7.44 K/UL — SIGNIFICANT CHANGE UP (ref 3.8–10.5)

## 2022-11-08 PROCEDURE — 81003 URINALYSIS AUTO W/O SCOPE: CPT

## 2022-11-08 PROCEDURE — 99285 EMERGENCY DEPT VISIT HI MDM: CPT

## 2022-11-08 PROCEDURE — 85025 COMPLETE CBC W/AUTO DIFF WBC: CPT

## 2022-11-08 PROCEDURE — U0005: CPT

## 2022-11-08 PROCEDURE — G1004: CPT

## 2022-11-08 PROCEDURE — U0003: CPT

## 2022-11-08 PROCEDURE — 96374 THER/PROPH/DIAG INJ IV PUSH: CPT | Mod: XU

## 2022-11-08 PROCEDURE — 87086 URINE CULTURE/COLONY COUNT: CPT

## 2022-11-08 PROCEDURE — 83690 ASSAY OF LIPASE: CPT

## 2022-11-08 PROCEDURE — 36415 COLL VENOUS BLD VENIPUNCTURE: CPT

## 2022-11-08 PROCEDURE — 99284 EMERGENCY DEPT VISIT MOD MDM: CPT | Mod: 25

## 2022-11-08 PROCEDURE — 74177 CT ABD & PELVIS W/CONTRAST: CPT | Mod: MG

## 2022-11-08 PROCEDURE — 74177 CT ABD & PELVIS W/CONTRAST: CPT | Mod: 26,MG

## 2022-11-08 PROCEDURE — 80053 COMPREHEN METABOLIC PANEL: CPT

## 2022-11-08 RX ORDER — SODIUM CHLORIDE 9 MG/ML
1000 INJECTION INTRAMUSCULAR; INTRAVENOUS; SUBCUTANEOUS ONCE
Refills: 0 | Status: COMPLETED | OUTPATIENT
Start: 2022-11-08 | End: 2022-11-08

## 2022-11-08 RX ORDER — METRONIDAZOLE 500 MG
500 TABLET ORAL ONCE
Refills: 0 | Status: COMPLETED | OUTPATIENT
Start: 2022-11-08 | End: 2022-11-08

## 2022-11-08 RX ORDER — METRONIDAZOLE 500 MG
1 TABLET ORAL
Qty: 30 | Refills: 0
Start: 2022-11-08 | End: 2022-11-17

## 2022-11-08 RX ORDER — MORPHINE SULFATE 50 MG/1
2 CAPSULE, EXTENDED RELEASE ORAL ONCE
Refills: 0 | Status: DISCONTINUED | OUTPATIENT
Start: 2022-11-08 | End: 2022-11-08

## 2022-11-08 RX ADMIN — SODIUM CHLORIDE 1000 MILLILITER(S): 9 INJECTION INTRAMUSCULAR; INTRAVENOUS; SUBCUTANEOUS at 12:40

## 2022-11-08 RX ADMIN — MORPHINE SULFATE 2 MILLIGRAM(S): 50 CAPSULE, EXTENDED RELEASE ORAL at 15:43

## 2022-11-08 RX ADMIN — Medication 500 MILLIGRAM(S): at 16:14

## 2022-11-08 RX ADMIN — Medication 1 TABLET(S): at 16:14

## 2022-11-08 NOTE — ED PROVIDER NOTE - NSICDXFAMILYHX_GEN_ALL_CORE_FT
FAMILY HISTORY:  Father  Still living? No  Family history of emphysema, Age at diagnosis: Age Unknown    Mother  Still living? No  Family history of lung cancer, Age at diagnosis: Age Unknown    Child  Still living? Yes, Estimated age: Age Unknown  Family history of diabetes mellitus, Age at diagnosis: Age Unknown

## 2022-11-08 NOTE — ED PROVIDER NOTE - CARE PROVIDER_API CALL
Robert Brantley ()  Internal Medicine  [address] [place] [address]  Phone: [phone]  Fax: [phone]  Follow Up Time: 1-3 Days

## 2022-11-08 NOTE — ED PROVIDER NOTE - NSFOLLOWUPINSTRUCTIONS_ED_ALL_ED_FT
DIVERTICULITIS - AfterCare(R) Instructions(ER/ED)           Diverticulitis    WHAT YOU NEED TO KNOW:    Diverticulitis is a condition that causes small pockets along your intestine called diverticula to become inflamed or infected. This is caused by hard bowel movements, food, or bacteria that get stuck in the pockets.  Diverticula         DISCHARGE INSTRUCTIONS:    Return to the emergency department if:   •You have bowel movement or foul-smelling discharge leaking from your vagina or in your urine.      •You have severe diarrhea.      •You urinate less than usual or not at all.      •You are not able to have a bowel movement.      •You cannot stop vomiting.      •You have severe abdominal pain, a fever, and your abdomen is larger than usual.      •You have new or increased blood in your bowel movements.      Call your doctor if:   •You have pain when you urinate.      •Your symptoms get worse or do not go away.      •You have questions or concerns about your condition or care.      Medicines:   •Antibiotics may be given to help treat a bacterial infection.      •Prescription pain medicine may be given. Ask your healthcare provider how to take this medicine safely. Some prescription pain medicines contain acetaminophen. Do not take other medicines that contain acetaminophen without talking to your healthcare provider. Too much acetaminophen may cause liver damage. Prescription pain medicine may cause constipation. Ask your healthcare provider how to prevent or treat constipation.       •Take your medicine as directed. Contact your healthcare provider if you think your medicine is not helping or if you have side effects. Tell your provider if you are allergic to any medicine. Keep a list of the medicines, vitamins, and herbs you take. Include the amounts, and when and why you take them. Bring the list or the pill bottles to follow-up visits. Carry your medicine list with you in case of an emergency.      Clear liquid diet: A clear liquid diet includes any liquids that you can see through. Examples include water, ginger-margarito, cranberry or apple juice, frozen fruit ice, or broth. Stay on a clear liquid diet until your symptoms are gone, or as directed.    Follow up with your doctor as directed: You may need to return for a colonoscopy. When your symptoms are gone, you may need a low-fat, high-fiber diet to prevent diverticulitis from developing again. Your healthcare provider or dietitian can help you create meal plans. Write down your questions so you remember to ask them during your visits.

## 2022-11-08 NOTE — ED PROVIDER NOTE - RESPIRATORY NEGATIVE STATEMENT, MLM
Class 1: Soft palate, uvula, fauces, pillars visible. no chest pain, no cough, and no shortness of breath.

## 2022-11-08 NOTE — ED PROVIDER NOTE - PROGRESS NOTE DETAILS
d/w  (gi) recommends d/c with bactrim and flagyl and f/u in office. Reevaluated patient at bedside.  Patient feeling much improved.  Discussed the results of all diagnostic testing in ED and copies of all reports given.   An opportunity to ask questions was given.  Discussed the importance of prompt, close medical follow-up.  Patient will return with any changes, concerns or persistent / worsening symptoms.  Understanding of all instructions verbalized.

## 2022-11-08 NOTE — ED PROVIDER NOTE - OBJECTIVE STATEMENT
Patient complaining of 1 week of right lower quadrant abdominal pain was intermittent now constant.  Pain associated with nausea.  No fevers chills cough chest pain short of breath diarrhea dysuria hematuria frequency vomiting.  Patient declining pain or nausea medicine  PMD Ammon

## 2022-11-08 NOTE — ED PROVIDER NOTE - CLINICAL SUMMARY MEDICAL DECISION MAKING FREE TEXT BOX
Patient complaining of 1 week of right lower quadrant abdominal pain was intermittent now constant.  Pain associated with nausea.  No fevers chills cough chest pain short of breath diarrhea dysuria hematuria frequency vomiting.  Patient declining pain or nausea medicine  Plan labs CT IV fluid differential includes but not limited to appendicitis diverticulitis kidney stone UTI or other intra-abdominal infection.

## 2022-11-08 NOTE — ED PROVIDER NOTE - NSICDXPASTSURGICALHX_GEN_ALL_CORE_FT
PAST SURGICAL HISTORY:  H/O varicose vein ligation 1974    No significant past surgical history

## 2022-11-08 NOTE — ED PROVIDER NOTE - NONTENDER LOCATION
left upper quadrant/right upper quadrant/left lower quadrant/left costovertebral angle/right costovertebral angle

## 2022-11-08 NOTE — ED ADULT NURSE NOTE - OBJECTIVE STATEMENT
patient came in ED from home with c/o intermittent RLQ abdominal pain and constipation X a week. alert and oriented X 4. non-labored respiration noted. patient denies nausea and vomiting. + constipation X 3-4 days. afebrile. patient denies urinary symptoms. Er MD s/e patient at the bedside.

## 2022-11-08 NOTE — ED PROVIDER NOTE - PATIENT PORTAL LINK FT
You can access the FollowMyHealth Patient Portal offered by [facility] by registering at the following website: [email or web address]. By joining [facility]’s FollowMyHealth portal, you will also be able to view your health information using other applications (apps) compatible with our system.

## 2022-11-09 LAB
CULTURE RESULTS: SIGNIFICANT CHANGE UP
SPECIMEN SOURCE: SIGNIFICANT CHANGE UP

## 2023-04-04 ENCOUNTER — APPOINTMENT (OUTPATIENT)
Dept: PAIN MANAGEMENT | Facility: CLINIC | Age: 83
End: 2023-04-04
Payer: MEDICARE

## 2023-04-04 VITALS — HEIGHT: 66 IN | BODY MASS INDEX: 24.11 KG/M2 | WEIGHT: 150 LBS

## 2023-04-04 DIAGNOSIS — R22.1 LOCALIZED SWELLING, MASS AND LUMP, NECK: ICD-10-CM

## 2023-04-04 DIAGNOSIS — R22.1 LOCALIZED SWELLING, MASS AND LUMP, HEAD: ICD-10-CM

## 2023-04-04 DIAGNOSIS — R22.0 LOCALIZED SWELLING, MASS AND LUMP, HEAD: ICD-10-CM

## 2023-04-04 PROCEDURE — 99214 OFFICE O/P EST MOD 30 MIN: CPT

## 2023-04-04 NOTE — ASSESSMENT
[FreeTextEntry1] : A thorough discussion occurred regarding available pain management treatment options including interventional, rehabilitative, pharmacological, and alternative modalities with the patient. We will proceed with the following:\par \par Interventional treatment options:\par - Would likely proceed with right PM C7-T1 ABIOLA with fluoroscopic guidance\par - We will discuss proceeding pending repeat cervical MRI review\par - can repeat right paramedian L4-5 LESI with fluoroscopic guidance with return of severe LE radicular pain \par - can consider lumbar facet directed intervention if inadequate relief of axial low back pain \par - see additional instructions below\par \par Rehabilitative options:\par - Completed prior physical therapy trials with benefit\par - Patient will continue HEP as tolerated\par - Previously provided home exercise sheet\par \par Medication based treatment options:\par - continue Tylenol 500-1000 mg TID PRN mild-moderate pain\par - continue Meloxicam 15 mg daily PRN moderate-severe pain\par - Restart Robaxin 500 mg up to BID PRN spasm \par - see additional instructions below\par \par Complementary treatment options:\par - Weight management and lifestyle modifications discussed\par - patient will consider acupuncture in the future if nonsustained relief\par \par Additional treatment recommendations as follows:\par - Obtain MRI cervical and lumbar spine; evaluate for progression\par - Follow up for MRI review over the phone\par \par The risks, benefits and alternatives of the proposed procedure were explained in detail with the patient.  The risks outlined include, but are not limited to, infection, bleeding, nerve injury, post dural headache, a temporary increase in pain, failure to resolve symptoms, allergic reaction, and possible elevation of blood sugar in diabetics if using corticosteroid.  All questions were answered to patient's apparent satisfaction and he/she verbalized an understanding.\par \par We have discussed the risks, benefits, and alternatives NSAID therapy including but not limited to the risk of bleeding, thrombosis, gastric mucosal irritation/ulceration, allergic reaction and kidney dysfunction; the patient verbalizes an understanding.\par \par The documentation recorded by the scribe, in my presence, accurately reflects the service I personally performed and the decisions made by me with my edits as appropriate.\par \par I, Imtiaz PAULA, personally performed the services described in this documentation incident to Andreas Albright DO.

## 2023-04-04 NOTE — HISTORY OF PRESENT ILLNESS
[Neck] : neck [Lower back] : lower back [Buttock] : buttock [Left Leg] : left leg [Right Leg] : right leg [6] : 6 [Dull/Aching] : dull/aching [Localized] : localized [Intermittent] : intermittent [Leisure] : leisure [Social interactions] : social interactions [Meds] : meds [Walking] : walking [FreeTextEntry1] : 4/4/2023 - Patient presents to the office for reevaluation; last seen in August 2022.  Patient complains of neck, right shoulder pain (new symptom), and lower back pain. Patient states that her pain got worse since the holidays so she scheduled a visit for possible interventional therapy. Patient has not seen other orthopedic specialist for neck or back or right shoulder. She states that one symptom isn't worse than the other they all kind of blend with each other.  Patient takes meloxicam sparingly. She is not in physical therapy at this time.  In regards to her neck she complains of decreased ROM and LUE paraesthesias when she sleeps.  Patient has right shoulder pain with ROM movements.  In regards to her back she complains of axial back pain along the belt line with mild radiation to b/l buttocks.  \par \par 08/16/22 - Patient presents for a FUV following an L4-5 LESI on 07/13/22. Patient reports experiencing 3 day pain flare-up which has resolved. She reports benefit following the injection. Pain is at an acceptable level. \par \par 06/28/22 - Patient presents for a FUV. Last seen 6 months ago.  Patient c/o low back pain radiating to the bilateral buttocks and to the RLE. She reports benefit with ROSALEE's in the past. Patient reports no observable benefit with knee gel injections x 4 with Dr. Glasgow. She reports some weakness and paraesthesia to the RLE. \par \par 11/30/21 - Patient presents for a FUV following a Left Paramedian L4-5 LESI on 11/10/21. Patient reports her radicular left leg pain was completely resolved following her last LESI. She is pleased with the results of her injection. \par \par 11/02/21 - Patient presents for a FUV. Patient reports that she has had an increase of lower back pain radiating into her left hip which began two weeks ago. Patient reports that prior to two weeks ago she didn't experience this pain since her previous ROSALEE. Patient reports that this acute flare up feels worse than her previous episodes. Patient has been taking meloxicam without significant relief.\par \par 8/6/21 - FUV after L4-L5 LESI on 7/23/21. She reports 75% improvement of her low back and right radicular leg pain. She has increased her activity level with reduction of pain. She is pleased with response to ROSALEE. No new complaints. \par \par 10/22/2020 - FUV for imaging review regarding her low back pain. Feels her low back pain has improved since initial fall until this morning. Feels increased stiffness in the morning which improves as the day continues. MRI reviewed in detail. \par \par 10/8/2020 - Patient complains of new injury. She reports fall 1 week ago down her stairs on her buttocks. No pain initially but for several days following developed increasing pain in low back into right buttock and posterior thigh. Was put on MDP for different issue which improved some of her back and leg pain but is fearful pain will return now that she completed oral steroids. No change in pain when sitting/standing/laying down. \par \par Injections: \par 1) Right paramedian C7-T1 ABIOLA (11/13/19)\par 2) Right C3-C4, C4-C5, C5-C6 facet joint RFA (11/19/20)\par 3) Left PM L4-L5 LESI (1/28/21, 2/24/21, 7/23/21, 11/10/21)\par 4) Right PM L4-5 LESI (07/13/22) \par \par Pertinent Surgical History: N/A\par \par Imaging:\par MRI Lumbar Spine (10/14/2020) - O/C\par \par T12-L1: Bulge and facet hypertrophy with foraminal stenosis. No herniation or central stenosis.\par L1-L2: Bulge and facet hypertrophy with foraminal stenosis. No herniation or central stenosis.\par L2-L3: Minor retrolisthesis. Bulge, spondylotic ridge, and facet hypertrophy with foraminal stenosis. Mild central stenosis. No central herniation.\par L3-L4: Minor retrolisthesis. Bulge, spondylotic ridge, and facet hypertrophy with foraminal stenosis and impingement on the exiting nerve roots. Mild central stenosis.\par L4-L5: Grade 1 anterior spondylolisthesis. No spondylolysis. Bulge, spondylotic ridge, and facet hypertrophy with foraminal stenosis and impingement on the exiting nerve roots. Mild-to-moderate central stenosis. No central herniation.\par L5-S1: Vacuum disc. Grade 1 anterior spondylolisthesis. No spondylolysis. Bulge and facet hypertrophy with foraminal stenosis and impingement on the exiting nerve roots. Mild-to-moderate central stenosis. No central herniation.\par \par Physician Disclaimer: I have personally reviewed and confirmed all HPI data with the patient. [] : no

## 2023-04-04 NOTE — PHYSICAL EXAM
[de-identified] : Constitutional: \par - No acute distress \par - Well developed; well nourished \par \par Neurological: \par - normal mood and affect \par - alert and oriented x 3  \par \par Cardiovascular: \par - grossly normal\par \par Lumbar Spine Exam: \par \par Inspection: \par erythema (-) \par ecchymosis (-) \par rashes (-) \par alignment: no scoliosis\par \par Palpation:  \par paraspinal tenderness:                  L (-) ; R (-) \par thoracic paraspinal tenderness:    L (-) ; R (-) \par sciatic nerve tenderness :             L (-) ; R (+) \par SI joint tenderness:                        L (-) ; R (-) \par GTB tenderness:                            L (-);  R (-) \par \par ROM:   \par Full ROM with mild stiffness with extremes of extension\par Pain with extremes of extension \par \par Strength:                   Right       Left    \par Hip Flexion:                (5/5)       (5/5) \par Quadriceps:               (5/5)       (5/5) \par Hamstrings:                (5/5)       (5/5) \par Ankle Dorsiflexion:     (5/5)       (5/5) \par EHL:                            (5/5)       (5/5) \par Ankle Plantarflexion:  (5/5)       (5/5) \par \par Special Tests: \par SLR:                      R (+) ; L (-) \par Facet loading:       R (+) ; L (+) \par GERMAINE test:          R (-) ; L (-)\par Tight Hamstrings   R (+) ; L (+) \par \par Neurologic: \par Light touch intact throughout LE bilaterally\par \par Reflexes normal and symmetric bilaterally\par \par Gait: \par mildly antalgic \par \par Cervical Spine Exam: \par \par Inspection:  \par erythema (-)  \par ecchymosis (-)  \par rashes (-)  \par \par Palpation:                                                 \par Cervical paraspinal tenderness:         R (+); L(+) \par Upper trapezius tenderness:              R (-); L (-) \par Rhomboids tenderness:                      R (-); L (-) \par Occipital Ridge:                                    R (-); L (-) \par Supraspinatus tenderness:                 R (-); L (-) \par \par ROM: Full ROM mild stiffness and pain with right rotation\par \par Strength Testing:             \par Deltoid                           R (5/5); L (5/5) \par Biceps:                          R (5/5); L (5/5) \par Triceps:                         R (5/5); L (5/5) \par Finger Abductors:         R (5/5); L (5/5) \par Grasp:                           R (5/5); L (5/5) \par \par Special Testing: \par Spurling Test:                  R (+); L (-) \par Facet load test:               R (-); L (-) \par \par Neuro: \par SILT throughout right upper extremity \par SILT throughout left upper extremity \par \par Reflexes: \par Biceps   -           R (2+); L (2+) \par Triceps  -           R (2+); L (2+) \par Brachioradialis- R (2+); L (2+)   \par \par No ankle clonus

## 2023-04-05 PROBLEM — R22.1 LOCALIZED SWELLING, MASS OR LUMP OF NECK: Status: ACTIVE | Noted: 2023-04-05

## 2023-04-05 PROBLEM — R22.0 SWELLING, MASS, OR LUMP IN HEAD AND NECK: Status: ACTIVE | Noted: 2023-04-05

## 2023-04-07 ENCOUNTER — FORM ENCOUNTER (OUTPATIENT)
Age: 83
End: 2023-04-07

## 2023-04-08 ENCOUNTER — APPOINTMENT (OUTPATIENT)
Dept: MRI IMAGING | Facility: CLINIC | Age: 83
End: 2023-04-08
Payer: MEDICARE

## 2023-04-08 PROCEDURE — 72148 MRI LUMBAR SPINE W/O DYE: CPT

## 2023-04-08 PROCEDURE — 72141 MRI NECK SPINE W/O DYE: CPT

## 2023-05-08 ENCOUNTER — APPOINTMENT (OUTPATIENT)
Dept: PAIN MANAGEMENT | Facility: CLINIC | Age: 83
End: 2023-05-08
Payer: MEDICARE

## 2023-05-08 VITALS — BODY MASS INDEX: 24.11 KG/M2 | WEIGHT: 150 LBS | HEIGHT: 66 IN

## 2023-05-08 PROCEDURE — 99214 OFFICE O/P EST MOD 30 MIN: CPT

## 2023-05-08 NOTE — PHYSICAL EXAM
[de-identified] : Constitutional: \par - No acute distress \par - Well developed; well nourished \par \par Neurological: \par - normal mood and affect \par - alert and oriented x 3  \par \par Cardiovascular: \par - grossly normal\par \par Lumbar Spine Exam: \par \par Inspection: \par erythema (-) \par ecchymosis (-) \par rashes (-) \par alignment: no scoliosis\par \par Palpation:  \par paraspinal tenderness:                  L (-) ; R (-) \par thoracic paraspinal tenderness:    L (-) ; R (-) \par sciatic nerve tenderness :             L (-) ; R (+) \par SI joint tenderness:                        L (-) ; R (-) \par GTB tenderness:                            L (-);  R (-) \par \par ROM:   \par Full ROM with mild stiffness with extremes of extension\par Pain with extremes of extension \par \par Strength:                   Right       Left    \par Hip Flexion:                (5/5)       (5/5) \par Quadriceps:               (5/5)       (5/5) \par Hamstrings:                (5/5)       (5/5) \par Ankle Dorsiflexion:     (5/5)       (5/5) \par EHL:                            (5/5)       (5/5) \par Ankle Plantarflexion:  (5/5)       (5/5) \par \par Special Tests: \par SLR:                      R (+) ; L (-) \par Facet loading:       R (+) ; L (+) \par GERMAINE test:          R (-) ; L (-)\par Tight Hamstrings   R (+) ; L (+) \par \par Neurologic: \par Light touch intact throughout LE bilaterally\par \par Reflexes normal and symmetric bilaterally\par \par Gait: \par mildly antalgic \par \par Cervical Spine Exam: \par \par Inspection:  \par erythema (-)  \par ecchymosis (-)  \par rashes (-)  \par \par Palpation:                                                 \par Cervical paraspinal tenderness:         R (+); L(+) \par Upper trapezius tenderness:              R (-); L (-) \par Rhomboids tenderness:                      R (-); L (-) \par Occipital Ridge:                                    R (-); L (-) \par Supraspinatus tenderness:                 R (-); L (-) \par \par ROM: Full ROM mild stiffness and pain with right rotation\par \par Strength Testing:             \par Deltoid                           R (5/5); L (5/5) \par Biceps:                          R (5/5); L (5/5) \par Triceps:                         R (5/5); L (5/5) \par Finger Abductors:         R (5/5); L (5/5) \par Grasp:                           R (5/5); L (5/5) \par \par Special Testing: \par Spurling Test:                  R (+); L (-) \par Facet load test:               R (-); L (-) \par \par Neuro: \par SILT throughout right upper extremity \par SILT throughout left upper extremity \par \par Reflexes: \par Biceps   -           R (2+); L (2+) \par Triceps  -           R (2+); L (2+) \par Brachioradialis- R (2+); L (2+)   \par \par No ankle clonus

## 2023-05-08 NOTE — ASSESSMENT
[FreeTextEntry1] : A thorough discussion occurred regarding available pain management treatment options including interventional, rehabilitative, pharmacological, and alternative modalities with the patient. We will proceed with the following:\par \par Interventional treatment options:\par - Proceed with right PM C7-T1 ABIOLA with fluoroscopic guidance\par - can consider repeat right paramedian L4-5 LESI with ongoing LE radicular pain \par - can consider lumbar facet directed intervention if inadequate relief of axial low back pain \par - see additional instructions below\par \par Rehabilitative options:\par - Completed prior physical therapy trials with benefit\par - Patient will continue HEP as tolerated\par - Previously provided home exercise sheet\par \par Medication based treatment options:\par - continue Tylenol 500-1000 mg TID PRN mild-moderate pain\par - continue Meloxicam 15 mg daily PRN moderate-severe pain\par - D/C Robaxin; unable to tolerate\par - Patient uses medication therapy sparingly\par - see additional instructions below\par \par Complementary treatment options:\par - Weight management and lifestyle modifications discussed\par - patient will consider acupuncture in the future if nonsustained relief\par \par Additional treatment recommendations as follows:\par - Follow up 1-2 weeks post injection for assessment of efficacy and further treatment recommendations\par \par The risks, benefits and alternatives of the proposed procedure were explained in detail with the patient.  The risks outlined include, but are not limited to, infection, bleeding, nerve injury, post dural headache, a temporary increase in pain, failure to resolve symptoms, allergic reaction, and possible elevation of blood sugar in diabetics if using corticosteroid.  All questions were answered to patient's apparent satisfaction and he/she verbalized an understanding.\par \par We have discussed the risks, benefits, and alternatives NSAID therapy including but not limited to the risk of bleeding, thrombosis, gastric mucosal irritation/ulceration, allergic reaction and kidney dysfunction; the patient verbalizes an understanding.\par \par The documentation recorded by the scribe, in my presence, accurately reflects the service I personally performed and the decisions made by me with my edits as appropriate.\par \par I, Jaleel Cui acting as scribe, attest that this documentation has been prepared under the direction and in the presence of Provider Andreas Albright DO.

## 2023-05-08 NOTE — HISTORY OF PRESENT ILLNESS
[Neck] : neck [Lower back] : lower back [Gradual] : gradual [7] : 7 [Burning] : burning [Shooting] : shooting [Constant] : constant [Household chores] : household chores [Leisure] : leisure [Rest] : rest [Standing] : standing [Walking] : walking [FreeTextEntry1] : 5/8/2023 - Patient presents for FUV after a Lumbar and Cervical MRI on 4/8/2023.  Complains predominately of neck pain with radiation to the right shoulder and upper extremity.  Again she feels that this is a newer pain from her chronic longstanding issues.  Uses medication therapy sparingly.\par \par 4/4/2023 - Patient presents to the office for reevaluation; last seen in August 2022.  Patient complains of neck, right shoulder pain (new symptom), and lower back pain. Patient states that her pain got worse since the holidays so she scheduled a visit for possible interventional therapy. Patient has not seen other orthopedic specialist for neck or back or right shoulder. She states that one symptom isn't worse than the other they all kind of blend with each other.  Patient takes meloxicam sparingly. She is not in physical therapy at this time.  In regards to her neck she complains of decreased ROM and LUE paraesthesias when she sleeps.  Patient has right shoulder pain with ROM movements.  In regards to her back she complains of axial back pain along the belt line with mild radiation to b/l buttocks.  \par \par 08/16/22 - Patient presents for a FUV following an L4-5 LESI on 07/13/22. Patient reports experiencing 3 day pain flare-up which has resolved. She reports benefit following the injection. Pain is at an acceptable level. \par \par 06/28/22 - Patient presents for a FUV. Last seen 6 months ago.  Patient c/o low back pain radiating to the bilateral buttocks and to the RLE. She reports benefit with ROSALEE's in the past. Patient reports no observable benefit with knee gel injections x 4 with Dr. Glasgow. She reports some weakness and paraesthesia to the RLE. \par \par 11/30/21 - Patient presents for a FUV following a Left Paramedian L4-5 LESI on 11/10/21. Patient reports her radicular left leg pain was completely resolved following her last LESI. She is pleased with the results of her injection. \par \par 11/02/21 - Patient presents for a FUV. Patient reports that she has had an increase of lower back pain radiating into her left hip which began two weeks ago. Patient reports that prior to two weeks ago she didn't experience this pain since her previous ROSALEE. Patient reports that this acute flare up feels worse than her previous episodes. Patient has been taking meloxicam without significant relief.\par \par 8/6/21 - FUV after L4-L5 LESI on 7/23/21. She reports 75% improvement of her low back and right radicular leg pain. She has increased her activity level with reduction of pain. She is pleased with response to ROSALEE. No new complaints. \par \par 10/22/2020 - FUV for imaging review regarding her low back pain. Feels her low back pain has improved since initial fall until this morning. Feels increased stiffness in the morning which improves as the day continues. MRI reviewed in detail. \par \par 10/8/2020 - Patient complains of new injury. She reports fall 1 week ago down her stairs on her buttocks. No pain initially but for several days following developed increasing pain in low back into right buttock and posterior thigh. Was put on MDP for different issue which improved some of her back and leg pain but is fearful pain will return now that she completed oral steroids. No change in pain when sitting/standing/laying down. \par \par Injections: \par 1) C7-T1 ABIOLA (11/13/19)\par 2) Right C3-C4, C4-C5, C5-C6 facet joint RFA (11/19/20)\par 3) Left PM L4-L5 LESI (1/28/21, 2/24/21, 7/23/21, 11/10/21)\par 4) Right PM L4-5 LESI (07/13/22) \par \par Pertinent Surgical History: N/A\par \par Imaging:\par 1) MRI Lumbar Spine (4/8/2023) - [facility]\par \par Findings: There is exaggerated lumbar lordosis with grade 1 anterolisthesis at L4-L5 and L5-S1. There is slight \par retrolisthesis at the thoracolumbar junction. The conus appears unremarkable. There is no acute vertebral body \par fracture. There are no gross paravertebral soft tissue masses. There are findings suggesting multiple cysts within \par bilateral kidneys right greater than left measuring up to 5-6 cm in the inferior right kidney incompletely evaluated on the current exam. There is no gross paravertebral soft tissue mass.\par L1-L2: There is disc bulging, bony ridging, facet arthrosis, and moderate bilateral foraminal narrowing.\par L2-L3: There is diffuse disc bulging, bony ridging, facet arthrosis, and moderate bilateral foraminal narrowing.\par L3-L4: There is disc bulging, bony ridging, facet arthrosis, broad-based posterior disc herniation, mild central stenosis, and left greater than right exiting L3 nerve root impingement.\par L4-L5: There is anterolisthesis, disc bulging, bony ridging, facet arthrosis, moderate central stenosis, and bilateral exiting L4 nerve root impingement.\par L5-S1: There is anterolisthesis, disc bulging, bony ridging, facet arthrosis, mild central stenosis, and bilateral exiting L5 nerve root impingement.\par \par 2) MRI Cervical Spine (4/8/2023) - [facility]\par \par Findings: There is straightening of the cervical lordosis and multilevel disc desiccation with loss of disc height and mild degenerative endplate changes in the mid-to-lower cervical spine without acute vertebral body fracture. There is multilevel uncovertebral hypertrophy. The visualized posterior fossa structures appear unremarkable. There are no gross paravertebral soft tissue masses.\par C2-C3: There is disc bulging, bony ridging, uncovertebral hypertrophy, and left greater than right neural foraminal narrowing.\par C3-C4: There is disc bulging, bony ridging, uncovertebral hypertrophy, and left greater than right neural foraminal narrowing.\par C4-C5: There is disc bulging, bony ridging, uncovertebral hypertrophy, mild central stenosis, and impingement upon bilateral exiting C5 nerve roots right greater than left.\par C5-C6: There is disc bulging, bony ridging, uncovertebral hypertrophy, cord impingement, and right greater than left exiting nerve root impingement.\par C6-C7: There is disc bulging, bony ridging, broad-based posterior disc herniation, cord impingement, and bilateral exiting nerve root impingement.\par \par Physician Disclaimer: I have personally reviewed and confirmed all HPI data with the patient. [] : no

## 2023-05-24 ENCOUNTER — APPOINTMENT (OUTPATIENT)
Dept: PAIN MANAGEMENT | Facility: CLINIC | Age: 83
End: 2023-05-24
Payer: MEDICARE

## 2023-05-24 PROCEDURE — 62321 NJX INTERLAMINAR CRV/THRC: CPT

## 2023-05-24 NOTE — PROCEDURE
[FreeTextEntry3] : Date of Service: 05/24/2023 \par \par Account: [id number]\par \par Patient: JOHANNE JOSHI \par \par YOB: 1940\par \par Age: 83 year\par \par Surgeon:      Andreas Albright DO\par \par Assistant:    None\par \par Pre-Operative Diagnosis:         Cervical Radiculopathy (M54.12)\par \par Post Operative Diagnosis:       Cervical Radiculopathy (M54.12)\par \par Procedure:             Cervical (C7-T1) interlaminar epidural steroid injection under fluoroscopic guidance\par \par Anesthesia:  MAC\par \par This procedure was carried out using fluoroscopic guidance.  The risks and benefits of the procedure were discussed extensively with the patient.  The consent of the patient was obtained and the following procedure was performed.  A timeout was performed with all essential staff present and the site and side were verified.\par \par The patient was placed in the prone position and optimized to patient comfort.  The cervical area was prepped and draped in a sterile fashion.  The fluoroscope visualized the C7-T1 interspace using slight cephalad-caudad angulation and this area was marked.  Using sterile technique the superficial skin was anesthetized with 1% Lidocaine.  A 20 gauge 3.5 inch Tuohy needle was advanced under fluoroscopy until ligament was engaged.  Using a contralateral oblique view, a "loss of resistance" to air technique was utilized in order to gain access to the epidural space.  After negative aspiration for heme and CSF, 1 cc of Omnipaque contrast was administered and the appropriate cervical epidurogram was obtained in the KARLA and A/P view as well as digital subtraction angiography.\par \par A total injectate of 3 cc of preservative free normal saline and 80 mg of Kenalog was then injected into the epidural space while maintaining meaningful verbal contact with the patient.  \par \par Vital signs remained normal throughout the procedure.  The patient tolerated the procedure well.  There were no immediate complications from the performed procedure.  The patient was instructed to apply ice over the injection sites for twenty minutes every two hours for the next 24 hours.\par \par Disposition:\par      1. The patient was advised to F/U in 1-2 weeks to assess the response to the injection.\par      2. The patient was also instructed to contact me immediately if there were any concerns related to the procedure performed.

## 2023-06-12 ENCOUNTER — APPOINTMENT (OUTPATIENT)
Dept: PAIN MANAGEMENT | Facility: CLINIC | Age: 83
End: 2023-06-12
Payer: MEDICARE

## 2023-06-12 VITALS — HEIGHT: 66 IN | WEIGHT: 150 LBS | BODY MASS INDEX: 24.11 KG/M2

## 2023-06-12 DIAGNOSIS — M47.812 SPONDYLOSIS W/OUT MYELOPATHY OR RADICULOPATHY, CERVICAL REGION: ICD-10-CM

## 2023-06-12 DIAGNOSIS — M54.12 RADICULOPATHY, CERVICAL REGION: ICD-10-CM

## 2023-06-12 PROCEDURE — 99214 OFFICE O/P EST MOD 30 MIN: CPT

## 2023-06-15 NOTE — ASSESSMENT
[FreeTextEntry1] : A thorough discussion occurred regarding available pain management treatment options including interventional, rehabilitative, pharmacological, and alternative modalities with the patient. We will proceed with the following:\par \par Interventional treatment options:\par - proceed with bilateral L4-L5, L5-S1 facet joint MBB with fluoroscopic guidance; proceed to RFA if adequate\par - consider repeat right PM C7-T1 ABIOLA with return of severe radicular pain\par - can consider repeat right paramedian L4-5 LESI with ongoing LE radicular pain \par - see additional instructions below\par \par Rehabilitative options:\par - Completed prior physical therapy trials with benefit\par - Patient will continue HEP as tolerated\par - Previously provided home exercise sheet\par \par Medication based treatment options:\par - continue Tylenol 500-1000 mg TID PRN mild-moderate pain\par - continue Meloxicam 15 mg daily PRN moderate-severe pain\par - Patient uses medication therapy sparingly\par - see additional instructions below\par \par Complementary treatment options:\par - Weight management and lifestyle modifications discussed\par - patient will consider acupuncture in the future if nonsustained relief\par \par Additional treatment recommendations as follows:\par - Follow up 1-2 weeks post injection for assessment of efficacy and further treatment recommendations\par \par The risks, benefits and alternatives of the proposed procedure were explained in detail with the patient.  The risks outlined include, but are not limited to, infection, bleeding, nerve injury, post dural headache, a temporary increase in pain, failure to resolve symptoms, allergic reaction, and possible elevation of blood sugar in diabetics if using corticosteroid.  All questions were answered to patient's apparent satisfaction and he/she verbalized an understanding.\par \par Patient presents with axial lumbar pain that has not responded to 3 months of conservative therapy including physical therapy or NSAID therapy.  The pain is interfering with activities of daily living and functionality.  There is no radicular pain.  The pain is exacerbated by facet loading.  Positive Kemps maneuver which is defined by pain reproduction with extension and rotation of the lumbar spine to the affected side.  The patient has not had a vertebral fusion at the levels of the proposed treatment.  There is no unexplained neurologic deficit.  There is no history of systemic infection, unstable medical condition, bleeding tendency, or local infection.  The injection is being performed to diagnose the facet joint as the source of the individual's pain.\par \par We have discussed the risks, benefits, and alternatives NSAID therapy including but not limited to the risk of bleeding, thrombosis, gastric mucosal irritation/ulceration, allergic reaction and kidney dysfunction; the patient verbalizes an understanding.\par \par The documentation recorded by the scribe, in my presence, accurately reflects the service I personally performed and the decisions made by me with my edits as appropriate.\par \par I, Imtiaz PAULA, personally performed the services described in this documentation incident to Andreas Albright DO.

## 2023-06-15 NOTE — HISTORY OF PRESENT ILLNESS
[Neck] : neck [Lower back] : lower back [Buttock] : buttock [Left Leg] : left leg [Right Leg] : right leg [6] : 6 [Dull/Aching] : dull/aching [Localized] : localized [Intermittent] : intermittent [Leisure] : leisure [Social interactions] : social interactions [Meds] : meds [Walking] : walking [FreeTextEntry1] : 6/12/2023- Patient presents for FUV after a C7-T1 ABIOLA on 5/24/2023.  Patient reports 80% relief following the injection.  Pleased with response.  She wants to address her lower back pain.  She complains predominantly axial lower back worse with getting up from sitting.  Occasional radiation to her right butt.  \par \par 5/8/2023 - Patient presents for FUV after a Lumbar and Cervical MRI on 4/8/2023.  Complains predominately of neck pain with radiation to the right shoulder and upper extremity.  Again she feels that this is a newer pain from her chronic longstanding issues.  Uses medication therapy sparingly.\par \par 4/4/2023 - Patient presents to the office for reevaluation; last seen in August 2022.  Patient complains of neck, right shoulder pain (new symptom), and lower back pain. Patient states that her pain got worse since the holidays so she scheduled a visit for possible interventional therapy. Patient has not seen other orthopedic specialist for neck or back or right shoulder. She states that one symptom isn't worse than the other they all kind of blend with each other.  Patient takes meloxicam sparingly. She is not in physical therapy at this time.  In regards to her neck she complains of decreased ROM and LUE paraesthesias when she sleeps.  Patient has right shoulder pain with ROM movements.  In regards to her back she complains of axial back pain along the belt line with mild radiation to b/l buttocks.  \par \par 08/16/22 - Patient presents for a FUV following an L4-5 LESI on 07/13/22. Patient reports experiencing 3 day pain flare-up which has resolved. She reports benefit following the injection. Pain is at an acceptable level. \par \par 06/28/22 - Patient presents for a FUV. Last seen 6 months ago.  Patient c/o low back pain radiating to the bilateral buttocks and to the RLE. She reports benefit with ROSALEE's in the past. Patient reports no observable benefit with knee gel injections x 4 with Dr. Glasgow. She reports some weakness and paraesthesia to the RLE. \par \par 11/30/21 - Patient presents for a FUV following a Left Paramedian L4-5 LESI on 11/10/21. Patient reports her radicular left leg pain was completely resolved following her last LESI. She is pleased with the results of her injection. \par \par 11/02/21 - Patient presents for a FUV. Patient reports that she has had an increase of lower back pain radiating into her left hip which began two weeks ago. Patient reports that prior to two weeks ago she didn't experience this pain since her previous ROSALEE. Patient reports that this acute flare up feels worse than her previous episodes. Patient has been taking meloxicam without significant relief.\par \par 8/6/21 - FUV after L4-L5 LESI on 7/23/21. She reports 75% improvement of her low back and right radicular leg pain. She has increased her activity level with reduction of pain. She is pleased with response to ROSALEE. No new complaints. \par \par 10/22/2020 - FUV for imaging review regarding her low back pain. Feels her low back pain has improved since initial fall until this morning. Feels increased stiffness in the morning which improves as the day continues. MRI reviewed in detail. \par \par 10/8/2020 - Patient complains of new injury. She reports fall 1 week ago down her stairs on her buttocks. No pain initially but for several days following developed increasing pain in low back into right buttock and posterior thigh. Was put on MDP for different issue which improved some of her back and leg pain but is fearful pain will return now that she completed oral steroids. No change in pain when sitting/standing/laying down. \par \par Injections: \par 1) C7-T1 ABIOLA (11/13/19, 5/24/2023)\par 2) Right C3-C4, C4-C5, C5-C6 facet joint RFA (11/19/20)\par 3) Left PM L4-L5 LESI (1/28/21, 2/24/21, 7/23/21, 11/10/21)\par 4) Right PM L4-5 LESI (07/13/22) \par \par Pertinent Surgical History: N/A\par \par Imaging:\par 1) MRI Lumbar Spine (4/8/2023) - [facility]\par Findings: There is exaggerated lumbar lordosis with grade 1 anterolisthesis at L4-L5 and L5-S1. There is slight \par retrolisthesis at the thoracolumbar junction. The conus appears unremarkable. There is no acute vertebral body \par fracture. There are no gross paravertebral soft tissue masses. There are findings suggesting multiple cysts within \par bilateral kidneys right greater than left measuring up to 5-6 cm in the inferior right kidney incompletely evaluated on the current exam. There is no gross paravertebral soft tissue mass.\par L1-L2: There is disc bulging, bony ridging, facet arthrosis, and moderate bilateral foraminal narrowing.\par L2-L3: There is diffuse disc bulging, bony ridging, facet arthrosis, and moderate bilateral foraminal narrowing.\par L3-L4: There is disc bulging, bony ridging, facet arthrosis, broad-based posterior disc herniation, mild central stenosis, and left greater than right exiting L3 nerve root impingement.\par L4-L5: There is anterolisthesis, disc bulging, bony ridging, facet arthrosis, moderate central stenosis, and bilateral exiting L4 nerve root impingement.\par L5-S1: There is anterolisthesis, disc bulging, bony ridging, facet arthrosis, mild central stenosis, and bilateral exiting L5 nerve root impingement.\par \par 2) MRI Cervical Spine (4/8/2023) - [facility]\par Findings: There is straightening of the cervical lordosis and multilevel disc desiccation with loss of disc height and mild degenerative endplate changes in the mid-to-lower cervical spine without acute vertebral body fracture. There is multilevel uncovertebral hypertrophy. The visualized posterior fossa structures appear unremarkable. There are no gross paravertebral soft tissue masses.\par C2-C3: There is disc bulging, bony ridging, uncovertebral hypertrophy, and left greater than right neural foraminal narrowing.\par C3-C4: There is disc bulging, bony ridging, uncovertebral hypertrophy, and left greater than right neural foraminal narrowing.\par C4-C5: There is disc bulging, bony ridging, uncovertebral hypertrophy, mild central stenosis, and impingement upon bilateral exiting C5 nerve roots right greater than left.\par C5-C6: There is disc bulging, bony ridging, uncovertebral hypertrophy, cord impingement, and right greater than left exiting nerve root impingement.\par C6-C7: There is disc bulging, bony ridging, broad-based posterior disc herniation, cord impingement, and bilateral exiting nerve root impingement.\par \par Physician Disclaimer: I have personally reviewed and confirmed all HPI data with the patient. [] : no

## 2023-06-15 NOTE — PHYSICAL EXAM
[de-identified] : Constitutional: \par - No acute distress \par - Well developed; well nourished \par \par Neurological: \par - normal mood and affect \par - alert and oriented x 3  \par \par Cardiovascular: \par - grossly normal\par \par Lumbar Spine Exam: \par \par Inspection: \par erythema (-) \par ecchymosis (-) \par rashes (-) \par alignment: no scoliosis\par \par Palpation:  \par paraspinal tenderness:                  L (-) ; R (-) \par thoracic paraspinal tenderness:    L (-) ; R (-) \par sciatic nerve tenderness :             L (-) ; R (+) \par SI joint tenderness:                        L (-) ; R (-) \par GTB tenderness:                            L (-);  R (-) \par \par ROM:   \par Full ROM with mild stiffness with extremes of extension\par Pain with extremes of extension \par \par Strength:                   Right       Left    \par Hip Flexion:                (5/5)       (5/5) \par Quadriceps:               (5/5)       (5/5) \par Hamstrings:                (5/5)       (5/5) \par Ankle Dorsiflexion:     (5/5)       (5/5) \par EHL:                            (5/5)       (5/5) \par Ankle Plantarflexion:  (5/5)       (5/5) \par \par Special Tests: \par SLR:                      R (-) ; L (-) \par Facet loading:       R (+) ; L (+) \par GERMAINE test:          R (-) ; L (-)\par Tight Hamstrings   R (+) ; L (+) \par \par Neurologic: \par Light touch intact throughout LE bilaterally\par \par Reflexes normal and symmetric bilaterally\par \par Gait: \par mildly antalgic \par \par Cervical Spine Exam: \par \par Inspection:  \par erythema (-)  \par ecchymosis (-)  \par rashes (-)  \par \par Palpation:                                                 \par Cervical paraspinal tenderness:         R (-); L(-) \par Upper trapezius tenderness:              R (-); L (-) \par Rhomboids tenderness:                      R (-); L (-) \par Occipital Ridge:                                    R (-); L (-) \par Supraspinatus tenderness:                 R (-); L (-) \par \par ROM: Diminished ROM mild stiffness and pain with right rotation\par \par Strength Testing:             \par Deltoid                           R (5/5); L (5/5) \par Biceps:                          R (5/5); L (5/5) \par Triceps:                         R (5/5); L (5/5) \par Finger Abductors:         R (5/5); L (5/5) \par Grasp:                           R (5/5); L (5/5) \par \par Special Testing: \par Spurling Test:                  R (-); L (-) \par Facet load test:               R (-); L (-) \par \par Neuro: \par SILT throughout right upper extremity \par SILT throughout left upper extremity \par \par Reflexes: \par Biceps   -           R (2+); L (2+) \par Triceps  -           R (2+); L (2+) \par Brachioradialis- R (2+); L (2+)   \par \par No ankle clonus

## 2023-06-15 NOTE — ADDENDUM
[FreeTextEntry1] : 06/15/2023 - Called to reiterate follow up with PCP for u/s findings of multiple thyroid nodules.  She was instructed to contact PCP prior to physical in July to discuss whether further imaging is suggested.  \par \par NECK ULTRASOUND zp rad 04/17/2023\par HISTORY: R22.1 Swelling/Mass/Lump Neck\par Multiple transverse and longitudinal high-resolution images of the thyroid gland\par were obtained with a linear transducer, including color Doppler evaluation.\par The right lobe of the thyroid measures 4.50 cm x 2.32 cm x 2.15 cm.\par The left lobe measures 4.82 cm x 1.72 cm x 2.19 cm.\par The isthmus measures 0.32 cm in width.\par The thyroid parenchyma is homogeneous.\par Midpole, right thyroid lobe, 0.4 x 0.2 x 0.3 cm hypoechoic nodule. TR4\par Lower pole, right thyroid lobe, 0.8 x 0.5 x 0.9 cm hypoechoic nodule. TR4\par Midpole, right thyroid lobe, 0.4 x 0.3 x 0.4 cm cyst. TR1\par Midpole, left thyroid lobe, 0.3 x 0.2 x 0.3 cm cyst. TR1\par Lower pole, left thyroid lobe, 0.7 x 0.4 x 0.6 cm hypoechoic nodule. TR4\par Vascular flow on color Doppler is normal and symmetric.\par There are no enlarged lymph nodes seen in the neck.\par Right posterior neck, in area of swelling, no sonographic abnormality noted.\par Left lateral neck, level V, superficial vein noted.

## 2023-06-23 ENCOUNTER — APPOINTMENT (OUTPATIENT)
Dept: PAIN MANAGEMENT | Facility: CLINIC | Age: 83
End: 2023-06-23
Payer: MEDICARE

## 2023-06-23 PROCEDURE — J3490M: CUSTOM

## 2023-06-23 PROCEDURE — 64493 INJ PARAVERT F JNT L/S 1 LEV: CPT | Mod: 50

## 2023-06-23 PROCEDURE — 64494 INJ PARAVERT F JNT L/S 2 LEV: CPT | Mod: 50

## 2023-06-23 NOTE — PROCEDURE
[FreeTextEntry3] : Date of Service: 06/23/2023 \par \par Account: [id number]\par \par Patient: JOHANNE JOSHI \par \par YOB: 1940\par \par Age: 83 year\par \par Surgeon:                  Andreas Albright DO\par \par Assistant:                None\par \par Pre-Operative Diagnosis:   Lumbar Spondylosis    \par \par Post Operative Diagnosis:  Lumbar Spondylosis\par \par Procedure:             Bilateral L4-5, L5-S1 facet block under fluoroscopic guidance.\par \par Anesthesia:            MAC\par \par This procedure was carried out using fluoroscopic guidance.  The risks and benefits of the procedure were discussed extensively with the patient.  The consent of the patient was obtained and the following procedure was performed. The patient was placed in the prone position on the fluoroscopy table and the area was prepped and draped in a sterile fashion.  A timeout was performed with all essential staff present and the site and side were verified.\par \par The lumbar vertebral bodies were identified and the fluoroscope was obliqued ipsilateral to approximately 30 degrees to reveal the appropriate anatomical view.  The junction of the superior articulate process and transverse process at the right L4 and L5 levels were identified and marked.   The skin at these target points was then localized using 1 cc of 1% Lidocaine at each injection site.  A spinal needle was then introduced and advanced to the above target points at the junction of the SAP and transverse processes until bone was contacted.\par \par Fluoroscope then focused on the right sacral ala on A/P view, and marked at this point.  The skin and subcutaneous structures were localized using 1cc of 1.0 % lidocaine.  A spinal needle was then advanced under fluoroscopic guidance until bone was contacted at the ala.  \par \par After negative aspiration for heme and CSF, an 1 cc of 0.5% Marcaine was injected at each of the injection sites.\par \par The procedure was performed in the exact same fashion on the contralateral left side at the L4, L5 and sacral ala levels.\par \par Vital signs remained normal throughout the procedure.  The patient tolerated the procedure well.  There were no immediate complications from the performed procedure.  The patient was instructed to apply ice over the injection sites for twenty minutes every two hours for the next 24 hours.\par \par Disposition:\par      1. The patient was advised to F/U in 1-2 weeks to assess the response to the injection. \par      2. They were advised to keep a pain diary to report the results of the diagnostic block at their FUV.\par      3. The patient was also instructed to contact me immediately if there were any concerns related to the procedure performed.

## 2023-07-11 ENCOUNTER — APPOINTMENT (OUTPATIENT)
Dept: PAIN MANAGEMENT | Facility: CLINIC | Age: 83
End: 2023-07-11
Payer: MEDICARE

## 2023-07-11 VITALS — WEIGHT: 150 LBS | BODY MASS INDEX: 24.11 KG/M2 | HEIGHT: 66 IN

## 2023-07-11 PROCEDURE — 99214 OFFICE O/P EST MOD 30 MIN: CPT

## 2023-07-11 NOTE — PHYSICAL EXAM
[de-identified] : Constitutional: \par - No acute distress \par - Well developed; well nourished \par \par Neurological: \par - normal mood and affect \par - alert and oriented x 3  \par \par Cardiovascular: \par - grossly normal\par \par Lumbar Spine Exam: \par \par Inspection: \par erythema (-) \par ecchymosis (-) \par rashes (-) \par alignment: no scoliosis\par \par Palpation:  \par paraspinal tenderness:                  L (-) ; R (-) \par thoracic paraspinal tenderness:    L (-) ; R (-) \par sciatic nerve tenderness :             L (-) ; R (+) \par SI joint tenderness:                        L (-) ; R (-) \par GTB tenderness:                            L (-);  R (-) \par \par ROM:   \par Full ROM with mild stiffness with extremes of extension\par Pain with extremes of extension \par \par Strength:                   Right       Left    \par Hip Flexion:                (5/5)       (5/5) \par Quadriceps:               (5/5)       (5/5) \par Hamstrings:                (5/5)       (5/5) \par Ankle Dorsiflexion:     (5/5)       (5/5) \par EHL:                            (5/5)       (5/5) \par Ankle Plantarflexion:  (5/5)       (5/5) \par \par Special Tests: \par SLR:                      R (eq) ; L (-) \par Facet loading:       R (+) ; L (+) \par GERMAINE test:          R (-) ; L (-)\par Tight Hamstrings   R (+) ; L (+) \par \par Neurologic: \par Light touch intact throughout LE bilaterally\par \par Reflexes normal and symmetric bilaterally\par \par Gait: \par mildly antalgic \par \par Cervical Spine Exam: \par \par Inspection:  \par erythema (-)  \par ecchymosis (-)  \par rashes (-)  \par \par Palpation:                                                 \par Cervical paraspinal tenderness:         R (-); L(-) \par Upper trapezius tenderness:              R (-); L (-) \par Rhomboids tenderness:                      R (-); L (-) \par Occipital Ridge:                                    R (-); L (-) \par Supraspinatus tenderness:                 R (-); L (-) \par \par ROM: Diminished ROM mild stiffness and pain with right rotation\par \par Strength Testing:             \par Deltoid                           R (5/5); L (5/5) \par Biceps:                          R (5/5); L (5/5) \par Triceps:                         R (5/5); L (5/5) \par Finger Abductors:         R (5/5); L (5/5) \par Grasp:                           R (5/5); L (5/5) \par \par Special Testing: \par Spurling Test:                  R (-); L (-) \par Facet load test:               R (-); L (-) \par \par Neuro: \par SILT throughout right upper extremity \par SILT throughout left upper extremity \par \par Reflexes: \par Biceps   -           R (2+); L (2+) \par Triceps  -           R (2+); L (2+) \par Brachioradialis- R (2+); L (2+)   \par \par No ankle clonus

## 2023-07-11 NOTE — ASSESSMENT
[FreeTextEntry1] : A thorough discussion occurred regarding available pain management treatment options including interventional, rehabilitative, pharmacological, and alternative modalities with the patient. We will proceed with the following:\par \par Interventional treatment options:\par - Proceed with right PM L4-L5 LESI with fluoroscopic guidance\par - Would likely proceed with repeat bilateral L4-L5, L5-S1 facet joint MBB for ongoing axial low back pain\par - consider repeat right PM C7-T1 ABIOLA with return of severe radicular pain\par -Counseled regarding corticosteroid limitations with interventional therapies\par - see additional instructions below\par \par Rehabilitative options:\par - Completed prior physical therapy trials with benefit\par - Patient will continue HEP as tolerated\par - Previously provided home exercise sheet\par \par Medication based treatment options:\par - continue Tylenol 500-1000 mg TID PRN mild-moderate pain\par - continue Meloxicam 15 mg daily PRN moderate-severe pain\par - Patient uses medication therapy sparingly\par - see additional instructions below\par \par Complementary treatment options:\par - Weight management and lifestyle modifications discussed\par - patient will consider acupuncture in the future if nonsustained relief\par \par Additional treatment recommendations as follows:\par - Follow up 1-2 weeks post injection for assessment of efficacy and further treatment recommendations\par \par The risks, benefits and alternatives of the proposed procedure were explained in detail with the patient.  The risks outlined include, but are not limited to, infection, bleeding, nerve injury, post dural headache, a temporary increase in pain, failure to resolve symptoms, allergic reaction, and possible elevation of blood sugar in diabetics if using corticosteroid.  All questions were answered to patient's apparent satisfaction and he/she verbalized an understanding.\par \par We have discussed the risks, benefits, and alternatives NSAID therapy including but not limited to the risk of bleeding, thrombosis, gastric mucosal irritation/ulceration, allergic reaction and kidney dysfunction; the patient verbalizes an understanding.\par \par The documentation recorded by the scribe, in my presence, accurately reflects the service I personally performed and the decisions made by me with my edits as appropriate.\par \par I, Jaleel Cui acting as scribe, attest that this documentation has been prepared under the direction and in the presence of Provider Andreas Albright DO.

## 2023-07-11 NOTE — HISTORY OF PRESENT ILLNESS
[Neck] : neck [Lower back] : lower back [Gradual] : gradual [7] : 7 [Burning] : burning [Shooting] : shooting [Constant] : constant [Household chores] : household chores [Leisure] : leisure [Rest] : rest [Standing] : standing [Walking] : walking [FreeTextEntry1] : 7/11/2023 - Patient presents for FUV after a bilateral L4-5, L5-S1 facet block on 6/23/2023. Patient reports greater than 80% reduction in her lower back pain for 6-8 hours the day of the facet block before symptoms returned to baseline.  She does report a return of her right buttock and right thigh pain as well.  She has responded well to lumbar ROSALEE for resolution of the symptoms.\par \par 6/12/2023- Patient presents for FUV after a C7-T1 ABIOLA on 5/24/2023.  Patient reports 80% relief following the injection.  Pleased with response.  She wants to address her lower back pain.  She complains predominantly axial lower back worse with getting up from sitting.  Occasional radiation to her right butt.  \par \par 5/8/2023 - Patient presents for FUV after a Lumbar and Cervical MRI on 4/8/2023.  Complains predominately of neck pain with radiation to the right shoulder and upper extremity.  Again she feels that this is a newer pain from her chronic longstanding issues.  Uses medication therapy sparingly.\par \par 4/4/2023 - Patient presents to the office for reevaluation; last seen in August 2022.  Patient complains of neck, right shoulder pain (new symptom), and lower back pain. Patient states that her pain got worse since the holidays so she scheduled a visit for possible interventional therapy. Patient has not seen other orthopedic specialist for neck or back or right shoulder. She states that one symptom isn't worse than the other they all kind of blend with each other.  Patient takes meloxicam sparingly. She is not in physical therapy at this time.  In regards to her neck she complains of decreased ROM and LUE paraesthesias when she sleeps.  Patient has right shoulder pain with ROM movements.  In regards to her back she complains of axial back pain along the belt line with mild radiation to b/l buttocks.  \par \par 08/16/22 - Patient presents for a FUV following an L4-5 LESI on 07/13/22. Patient reports experiencing 3 day pain flare-up which has resolved. She reports benefit following the injection. Pain is at an acceptable level. \par \par 06/28/22 - Patient presents for a FUV. Last seen 6 months ago.  Patient c/o low back pain radiating to the bilateral buttocks and to the RLE. She reports benefit with ROSALEE's in the past. Patient reports no observable benefit with knee gel injections x 4 with Dr. Glasgow. She reports some weakness and paraesthesia to the RLE. \par \par 11/30/21 - Patient presents for a FUV following a Left Paramedian L4-5 LESI on 11/10/21. Patient reports her radicular left leg pain was completely resolved following her last LESI. She is pleased with the results of her injection. \par \par 11/02/21 - Patient presents for a FUV. Patient reports that she has had an increase of lower back pain radiating into her left hip which began two weeks ago. Patient reports that prior to two weeks ago she didn't experience this pain since her previous ROSALEE. Patient reports that this acute flare up feels worse than her previous episodes. Patient has been taking meloxicam without significant relief.\par \par 8/6/21 - FUV after L4-L5 LESI on 7/23/21. She reports 75% improvement of her low back and right radicular leg pain. She has increased her activity level with reduction of pain. She is pleased with response to ROSALEE. No new complaints. \par \par 10/22/2020 - FUV for imaging review regarding her low back pain. Feels her low back pain has improved since initial fall until this morning. Feels increased stiffness in the morning which improves as the day continues. MRI reviewed in detail. \par \par 10/8/2020 - Patient complains of new injury. She reports fall 1 week ago down her stairs on her buttocks. No pain initially but for several days following developed increasing pain in low back into right buttock and posterior thigh. Was put on MDP for different issue which improved some of her back and leg pain but is fearful pain will return now that she completed oral steroids. No change in pain when sitting/standing/laying down. \par \par Injections: \par 1) C7-T1 ABIOLA (11/13/19, 5/24/2023)\par 2) Right C3-C4, C4-C5, C5-C6 facet joint RFA (11/19/20)\par 3) Left PM L4-L5 LESI (1/28/21, 2/24/21, 7/23/21, 11/10/21)\par 4) Right PM L4-5 LESI (07/13/22) \par 5) Bilateral L4-5, L5-S1 facet block (6/23/2023)\par \par Pertinent Surgical History: N/A\par \par Imaging:\par 1) MRI Lumbar Spine (4/8/2023) - [facility]\par Findings: There is exaggerated lumbar lordosis with grade 1 anterolisthesis at L4-L5 and L5-S1. There is slight \par retrolisthesis at the thoracolumbar junction. The conus appears unremarkable. There is no acute vertebral body \par fracture. There are no gross paravertebral soft tissue masses. There are findings suggesting multiple cysts within \par bilateral kidneys right greater than left measuring up to 5-6 cm in the inferior right kidney incompletely evaluated on the current exam. There is no gross paravertebral soft tissue mass.\par L1-L2: There is disc bulging, bony ridging, facet arthrosis, and moderate bilateral foraminal narrowing.\par L2-L3: There is diffuse disc bulging, bony ridging, facet arthrosis, and moderate bilateral foraminal narrowing.\par L3-L4: There is disc bulging, bony ridging, facet arthrosis, broad-based posterior disc herniation, mild central stenosis, and left greater than right exiting L3 nerve root impingement.\par L4-L5: There is anterolisthesis, disc bulging, bony ridging, facet arthrosis, moderate central stenosis, and bilateral exiting L4 nerve root impingement.\par L5-S1: There is anterolisthesis, disc bulging, bony ridging, facet arthrosis, mild central stenosis, and bilateral exiting L5 nerve root impingement.\par \par 2) MRI Cervical Spine (4/8/2023) - [facility]\par Findings: There is straightening of the cervical lordosis and multilevel disc desiccation with loss of disc height and mild degenerative endplate changes in the mid-to-lower cervical spine without acute vertebral body fracture. There is multilevel uncovertebral hypertrophy. The visualized posterior fossa structures appear unremarkable. There are no gross paravertebral soft tissue masses.\par C2-C3: There is disc bulging, bony ridging, uncovertebral hypertrophy, and left greater than right neural foraminal narrowing.\par C3-C4: There is disc bulging, bony ridging, uncovertebral hypertrophy, and left greater than right neural foraminal narrowing.\par C4-C5: There is disc bulging, bony ridging, uncovertebral hypertrophy, mild central stenosis, and impingement upon bilateral exiting C5 nerve roots right greater than left.\par C5-C6: There is disc bulging, bony ridging, uncovertebral hypertrophy, cord impingement, and right greater than left exiting nerve root impingement.\par C6-C7: There is disc bulging, bony ridging, broad-based posterior disc herniation, cord impingement, and bilateral exiting nerve root impingement.\par \par Physician Disclaimer: I have personally reviewed and confirmed all HPI data with the patient. [] : no

## 2023-08-02 ENCOUNTER — APPOINTMENT (OUTPATIENT)
Dept: PAIN MANAGEMENT | Facility: CLINIC | Age: 83
End: 2023-08-02

## 2023-08-15 ENCOUNTER — APPOINTMENT (OUTPATIENT)
Dept: PAIN MANAGEMENT | Facility: CLINIC | Age: 83
End: 2023-08-15

## 2023-08-18 ENCOUNTER — APPOINTMENT (OUTPATIENT)
Dept: ORTHOPEDIC SURGERY | Facility: CLINIC | Age: 83
End: 2023-08-18
Payer: MEDICARE

## 2023-08-18 VITALS — WEIGHT: 150 LBS | BODY MASS INDEX: 24.11 KG/M2 | HEIGHT: 66 IN

## 2023-08-18 PROCEDURE — 73564 X-RAY EXAM KNEE 4 OR MORE: CPT | Mod: 50

## 2023-08-18 PROCEDURE — 99213 OFFICE O/P EST LOW 20 MIN: CPT | Mod: 25

## 2023-08-18 PROCEDURE — 20610 DRAIN/INJ JOINT/BURSA W/O US: CPT | Mod: 50

## 2023-08-18 PROCEDURE — J3490M: CUSTOM | Mod: NC

## 2023-08-18 RX ORDER — HYALURONATE SODIUM 30 MG/2 ML
30 SYRINGE (ML) INTRAARTICULAR
Qty: 4 | Refills: 0 | Status: ACTIVE | COMMUNITY
Start: 2023-08-18 | End: 1900-01-01

## 2023-08-18 RX ORDER — DICLOFENAC SODIUM 75 MG/1
75 TABLET, DELAYED RELEASE ORAL
Qty: 30 | Refills: 0 | Status: ACTIVE | COMMUNITY
Start: 2023-08-18 | End: 1900-01-01

## 2023-08-18 NOTE — IMAGING
[de-identified] : Bilateral knee: No effusion, no warmth, no ecchymosis Medial joint line tenderness to palpation  Range of motion 0-130 with mild anterior crepitus 5/5 quadriceps and hamstring strength Ligamentously stable Motor and sensory intact distally Non antalgic gait Negative Daly [Bilateral] : knee bilaterally [All Views] : anteroposterior, lateral, skyline, and anteroposterior standing [Mild tricompartmental OA medial narrowing] : Mild tricompartmental OA medial narrowing

## 2023-08-18 NOTE — PROCEDURE
[FreeTextEntry3] : - Large joint injection was performed of the BILAT knee.  - The indication for this procedure was pain and inflammation. Patient has tried OTC's including aspirin, Ibuprofen, Aleve, etc or prescription NSAIDS, and/or exercises at home and/or physical therapy without satisfactory response, patient had decreased mobility in the joint and the risks benefits, and alternatives have been discussed, and verbal consent was obtained. The site was prepped with alcohol, betadine, ethyl chloride sprayed topically and sterile technique used.  - An injection of Betamethasone 2cc; Marcaine 5cc injected. - Patient was advised to call if redness, pain or fever occur, apply ice for 15 minutes out of every hour for the next 12-24 hours as tolerated and patient was advised to rest the joint(s) for 2 days.

## 2023-08-18 NOTE — ASSESSMENT
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  She reports excellent relief with the Orthovisc injections last year.  Advised a corticosteroid injection to both knees today and follow-up once Orthovisc is approved for 1 or both knees next visit if needed.

## 2023-08-18 NOTE — HISTORY OF PRESENT ILLNESS
[6] : 6 [4] : 4 [Dull/Aching] : dull/aching [Sharp] : sharp [Rest] : rest [Walking] : walking [de-identified] : 08/18/2023: PATIENT PRESENTS TODAY FOR A NEW INJURY VISIT FOR THE BL KNEE. STATES SHE HAS BEEN HAVING PAIN FOR 3 YEARS. DENIES ANY INJURY.  [] : Post Surgical Visit: no [FreeTextEntry1] : R KNEE

## 2023-09-08 ENCOUNTER — APPOINTMENT (OUTPATIENT)
Dept: ORTHOPEDIC SURGERY | Facility: CLINIC | Age: 83
End: 2023-09-08
Payer: MEDICARE

## 2023-09-08 VITALS — WEIGHT: 150 LBS | BODY MASS INDEX: 24.11 KG/M2 | HEIGHT: 66 IN

## 2023-09-08 PROCEDURE — 20610 DRAIN/INJ JOINT/BURSA W/O US: CPT | Mod: 50

## 2023-09-08 PROCEDURE — 99213 OFFICE O/P EST LOW 20 MIN: CPT | Mod: 25

## 2023-09-08 NOTE — HISTORY OF PRESENT ILLNESS
[6] : 6 [4] : 4 [Dull/Aching] : dull/aching [Sharp] : sharp [Rest] : rest [Walking] : walking [de-identified] : 08/18/2023: PATIENT PRESENTS TODAY FOR A NEW INJURY VISIT FOR THE BL KNEE. STATES SHE HAS BEEN HAVING PAIN FOR 3 YEARS. DENIES ANY INJURY.   9/8/23: here to follow up on bilateral knees. CSI from 8/18/23 gave relief for about 3 weeks. Still awaiting approval for visco. Notes slight improvement. Right is worse than left. Pain with prolonged walking. Mentioned pain in right glute, occasional tingling down leg.  [] : Post Surgical Visit: no [FreeTextEntry1] : R KNEE

## 2023-09-08 NOTE — PROCEDURE
[FreeTextEntry3] : Large joint injection was performed of the BILAT knee.  The indication for this procedure was pain, inflammation and x-ray evidence of Osteoarthritis on this or prior visit. The site was prepped with alcohol, betadine, ethyl chloride sprayed topically and sterile technique used.  An injection of ORTHOVISC 2ml, series #1_ was used.   Patient was advised to call if redness, pain or fever occur, apply ice for 15 minutes out of every hour for the next 12-24 hours as tolerated and patient was advised to rest the joint(s) for 2 days. Patient has tried OTC's including aspirin, Ibuprofen, Aleve, etc or prescription NSAIDS, and/or exercises at home and/or physical therapy without satisfactory response, patient had decreased mobility in the joint and the risks benefits, and alternatives have been discussed, and verbal consent was obtained.  Ultrasound guidance was indicated for this patient due to prior failure or difficult injection. All ultrasound images have been permanently captured and stored accordingly in our picture archiving and communication system. Visualization of the needle and placement of injection was performed without complication.

## 2023-09-08 NOTE — IMAGING
[Bilateral] : knee bilaterally [All Views] : anteroposterior, lateral, skyline, and anteroposterior standing [Mild tricompartmental OA medial narrowing] : Mild tricompartmental OA medial narrowing [de-identified] : Bilateral knee: No effusion, no warmth, no ecchymosis Medial joint line tenderness to palpation  Range of motion 0-130 with mild anterior crepitus

## 2023-09-09 ENCOUNTER — TRANSCRIPTION ENCOUNTER (OUTPATIENT)
Age: 83
End: 2023-09-09

## 2023-09-11 ENCOUNTER — APPOINTMENT (OUTPATIENT)
Dept: ORTHOPEDIC SURGERY | Facility: CLINIC | Age: 83
End: 2023-09-11
Payer: MEDICARE

## 2023-09-11 VITALS — BODY MASS INDEX: 24.11 KG/M2 | HEIGHT: 66 IN | WEIGHT: 150 LBS

## 2023-09-11 PROCEDURE — 72110 X-RAY EXAM L-2 SPINE 4/>VWS: CPT

## 2023-09-11 PROCEDURE — 99214 OFFICE O/P EST MOD 30 MIN: CPT

## 2023-09-11 PROCEDURE — 73502 X-RAY EXAM HIP UNI 2-3 VIEWS: CPT

## 2023-09-15 ENCOUNTER — APPOINTMENT (OUTPATIENT)
Dept: ORTHOPEDIC SURGERY | Facility: CLINIC | Age: 83
End: 2023-09-15
Payer: MEDICARE

## 2023-09-15 PROCEDURE — 99212 OFFICE O/P EST SF 10 MIN: CPT | Mod: 25

## 2023-09-15 PROCEDURE — 20611 DRAIN/INJ JOINT/BURSA W/US: CPT | Mod: 50

## 2023-09-22 ENCOUNTER — APPOINTMENT (OUTPATIENT)
Dept: ORTHOPEDIC SURGERY | Facility: CLINIC | Age: 83
End: 2023-09-22
Payer: MEDICARE

## 2023-09-22 VITALS — HEIGHT: 66 IN | WEIGHT: 150 LBS | BODY MASS INDEX: 24.11 KG/M2

## 2023-09-22 PROCEDURE — 99212 OFFICE O/P EST SF 10 MIN: CPT | Mod: 25

## 2023-09-22 PROCEDURE — 20610 DRAIN/INJ JOINT/BURSA W/O US: CPT | Mod: 50

## 2023-09-29 ENCOUNTER — APPOINTMENT (OUTPATIENT)
Dept: ORTHOPEDIC SURGERY | Facility: CLINIC | Age: 83
End: 2023-09-29
Payer: MEDICARE

## 2023-09-29 VITALS — WEIGHT: 150 LBS | BODY MASS INDEX: 24.11 KG/M2 | HEIGHT: 66 IN

## 2023-09-29 DIAGNOSIS — M17.12 UNILATERAL PRIMARY OSTEOARTHRITIS, LEFT KNEE: ICD-10-CM

## 2023-09-29 DIAGNOSIS — M17.11 UNILATERAL PRIMARY OSTEOARTHRITIS, RIGHT KNEE: ICD-10-CM

## 2023-09-29 PROCEDURE — 99212 OFFICE O/P EST SF 10 MIN: CPT | Mod: 25

## 2023-09-29 PROCEDURE — 20610 DRAIN/INJ JOINT/BURSA W/O US: CPT | Mod: 50

## 2023-10-16 ENCOUNTER — APPOINTMENT (OUTPATIENT)
Dept: ORTHOPEDIC SURGERY | Facility: CLINIC | Age: 83
End: 2023-10-16
Payer: MEDICARE

## 2023-10-16 VITALS — HEIGHT: 66 IN | WEIGHT: 150 LBS | BODY MASS INDEX: 24.11 KG/M2

## 2023-10-16 DIAGNOSIS — M70.61 TROCHANTERIC BURSITIS, RIGHT HIP: ICD-10-CM

## 2023-10-16 PROCEDURE — 99213 OFFICE O/P EST LOW 20 MIN: CPT

## 2023-10-17 ENCOUNTER — APPOINTMENT (OUTPATIENT)
Dept: PAIN MANAGEMENT | Facility: CLINIC | Age: 83
End: 2023-10-17
Payer: MEDICARE

## 2023-10-17 VITALS — HEIGHT: 66 IN | BODY MASS INDEX: 24.43 KG/M2 | WEIGHT: 152 LBS

## 2023-10-17 PROCEDURE — 99214 OFFICE O/P EST MOD 30 MIN: CPT

## 2023-10-27 ENCOUNTER — APPOINTMENT (OUTPATIENT)
Dept: ORTHOPEDIC SURGERY | Facility: CLINIC | Age: 83
End: 2023-10-27
Payer: MEDICARE

## 2023-10-27 DIAGNOSIS — M25.551 PAIN IN RIGHT HIP: ICD-10-CM

## 2023-10-27 PROCEDURE — 99214 OFFICE O/P EST MOD 30 MIN: CPT

## 2023-11-01 ENCOUNTER — APPOINTMENT (OUTPATIENT)
Dept: PAIN MANAGEMENT | Facility: CLINIC | Age: 83
End: 2023-11-01
Payer: MEDICARE

## 2023-11-01 PROCEDURE — 62323 NJX INTERLAMINAR LMBR/SAC: CPT

## 2023-11-03 ENCOUNTER — APPOINTMENT (OUTPATIENT)
Dept: MRI IMAGING | Facility: CLINIC | Age: 83
End: 2023-11-03

## 2023-11-08 ENCOUNTER — RESULT REVIEW (OUTPATIENT)
Age: 83
End: 2023-11-08

## 2023-11-14 ENCOUNTER — APPOINTMENT (OUTPATIENT)
Dept: PAIN MANAGEMENT | Facility: CLINIC | Age: 83
End: 2023-11-14

## 2023-11-14 VITALS — HEIGHT: 66 IN | BODY MASS INDEX: 24.43 KG/M2 | WEIGHT: 152 LBS

## 2023-11-17 ENCOUNTER — APPOINTMENT (OUTPATIENT)
Dept: ORTHOPEDIC SURGERY | Facility: CLINIC | Age: 83
End: 2023-11-17
Payer: MEDICARE

## 2023-11-17 DIAGNOSIS — M87.051 IDIOPATHIC ASEPTIC NECROSIS OF RIGHT FEMUR: ICD-10-CM

## 2023-11-17 DIAGNOSIS — M16.11 UNILATERAL PRIMARY OSTEOARTHRITIS, RIGHT HIP: ICD-10-CM

## 2023-11-17 DIAGNOSIS — Z87.19 PERSONAL HISTORY OF OTHER DISEASES OF THE DIGESTIVE SYSTEM: ICD-10-CM

## 2023-11-17 PROCEDURE — 99215 OFFICE O/P EST HI 40 MIN: CPT

## 2023-12-04 ENCOUNTER — APPOINTMENT (OUTPATIENT)
Dept: ORTHOPEDIC SURGERY | Facility: CLINIC | Age: 83
End: 2023-12-04
Payer: MEDICARE

## 2023-12-04 VITALS — HEIGHT: 66 IN | WEIGHT: 152 LBS | BODY MASS INDEX: 24.43 KG/M2

## 2023-12-04 DIAGNOSIS — M50.90 CERVICAL DISC DISORDER, UNSPECIFIED, UNSPECIFIED CERVICAL REGION: ICD-10-CM

## 2023-12-04 DIAGNOSIS — M75.51 BURSITIS OF RIGHT SHOULDER: ICD-10-CM

## 2023-12-04 DIAGNOSIS — M75.41 IMPINGEMENT SYNDROME OF RIGHT SHOULDER: ICD-10-CM

## 2023-12-04 PROCEDURE — 99214 OFFICE O/P EST MOD 30 MIN: CPT

## 2023-12-04 PROCEDURE — 73030 X-RAY EXAM OF SHOULDER: CPT | Mod: RT

## 2023-12-04 PROCEDURE — 72040 X-RAY EXAM NECK SPINE 2-3 VW: CPT

## 2023-12-04 PROCEDURE — 73010 X-RAY EXAM OF SHOULDER BLADE: CPT | Mod: RT

## 2023-12-07 ENCOUNTER — OUTPATIENT (OUTPATIENT)
Dept: OUTPATIENT SERVICES | Facility: HOSPITAL | Age: 83
LOS: 1 days | Discharge: HOME | End: 2023-12-07
Payer: MEDICARE

## 2023-12-07 VITALS
OXYGEN SATURATION: 98 % | DIASTOLIC BLOOD PRESSURE: 88 MMHG | HEIGHT: 65 IN | TEMPERATURE: 98 F | HEART RATE: 62 BPM | SYSTOLIC BLOOD PRESSURE: 149 MMHG | WEIGHT: 151.24 LBS | RESPIRATION RATE: 18 BRPM

## 2023-12-07 DIAGNOSIS — M16.11 UNILATERAL PRIMARY OSTEOARTHRITIS, RIGHT HIP: ICD-10-CM

## 2023-12-07 DIAGNOSIS — Z98.89 OTHER SPECIFIED POSTPROCEDURAL STATES: Chronic | ICD-10-CM

## 2023-12-07 DIAGNOSIS — F41.9 ANXIETY DISORDER, UNSPECIFIED: ICD-10-CM

## 2023-12-07 DIAGNOSIS — Z01.818 ENCOUNTER FOR OTHER PREPROCEDURAL EXAMINATION: ICD-10-CM

## 2023-12-07 LAB
A1C WITH ESTIMATED AVERAGE GLUCOSE RESULT: 5.7 % — HIGH (ref 4–5.6)
A1C WITH ESTIMATED AVERAGE GLUCOSE RESULT: 5.7 % — HIGH (ref 4–5.6)
ALBUMIN SERPL ELPH-MCNC: 3.6 G/DL — SIGNIFICANT CHANGE UP (ref 3.3–5)
ALBUMIN SERPL ELPH-MCNC: 3.6 G/DL — SIGNIFICANT CHANGE UP (ref 3.3–5)
ALP SERPL-CCNC: 50 U/L — SIGNIFICANT CHANGE UP (ref 40–120)
ALP SERPL-CCNC: 50 U/L — SIGNIFICANT CHANGE UP (ref 40–120)
ALT FLD-CCNC: 20 U/L — SIGNIFICANT CHANGE UP (ref 12–78)
ALT FLD-CCNC: 20 U/L — SIGNIFICANT CHANGE UP (ref 12–78)
ANION GAP SERPL CALC-SCNC: 4 MMOL/L — LOW (ref 5–17)
ANION GAP SERPL CALC-SCNC: 4 MMOL/L — LOW (ref 5–17)
APTT BLD: 34.7 SEC — SIGNIFICANT CHANGE UP (ref 24.5–35.6)
APTT BLD: 34.7 SEC — SIGNIFICANT CHANGE UP (ref 24.5–35.6)
AST SERPL-CCNC: 13 U/L — LOW (ref 15–37)
AST SERPL-CCNC: 13 U/L — LOW (ref 15–37)
BASOPHILS # BLD AUTO: 0.04 K/UL — SIGNIFICANT CHANGE UP (ref 0–0.2)
BASOPHILS # BLD AUTO: 0.04 K/UL — SIGNIFICANT CHANGE UP (ref 0–0.2)
BASOPHILS NFR BLD AUTO: 0.9 % — SIGNIFICANT CHANGE UP (ref 0–2)
BASOPHILS NFR BLD AUTO: 0.9 % — SIGNIFICANT CHANGE UP (ref 0–2)
BILIRUB SERPL-MCNC: 0.3 MG/DL — SIGNIFICANT CHANGE UP (ref 0.2–1.2)
BILIRUB SERPL-MCNC: 0.3 MG/DL — SIGNIFICANT CHANGE UP (ref 0.2–1.2)
BLD GP AB SCN SERPL QL: SIGNIFICANT CHANGE UP
BLD GP AB SCN SERPL QL: SIGNIFICANT CHANGE UP
BUN SERPL-MCNC: 24 MG/DL — HIGH (ref 7–23)
BUN SERPL-MCNC: 24 MG/DL — HIGH (ref 7–23)
CALCIUM SERPL-MCNC: 9.1 MG/DL — SIGNIFICANT CHANGE UP (ref 8.5–10.1)
CALCIUM SERPL-MCNC: 9.1 MG/DL — SIGNIFICANT CHANGE UP (ref 8.5–10.1)
CHLORIDE SERPL-SCNC: 109 MMOL/L — HIGH (ref 96–108)
CHLORIDE SERPL-SCNC: 109 MMOL/L — HIGH (ref 96–108)
CO2 SERPL-SCNC: 28 MMOL/L — SIGNIFICANT CHANGE UP (ref 22–31)
CO2 SERPL-SCNC: 28 MMOL/L — SIGNIFICANT CHANGE UP (ref 22–31)
CREAT SERPL-MCNC: 0.82 MG/DL — SIGNIFICANT CHANGE UP (ref 0.5–1.3)
CREAT SERPL-MCNC: 0.82 MG/DL — SIGNIFICANT CHANGE UP (ref 0.5–1.3)
EGFR: 72 ML/MIN/1.73M2 — SIGNIFICANT CHANGE UP
EGFR: 72 ML/MIN/1.73M2 — SIGNIFICANT CHANGE UP
EOSINOPHIL # BLD AUTO: 0.11 K/UL — SIGNIFICANT CHANGE UP (ref 0–0.5)
EOSINOPHIL # BLD AUTO: 0.11 K/UL — SIGNIFICANT CHANGE UP (ref 0–0.5)
EOSINOPHIL NFR BLD AUTO: 2.6 % — SIGNIFICANT CHANGE UP (ref 0–6)
EOSINOPHIL NFR BLD AUTO: 2.6 % — SIGNIFICANT CHANGE UP (ref 0–6)
ESTIMATED AVERAGE GLUCOSE: 117 MG/DL — HIGH (ref 68–114)
ESTIMATED AVERAGE GLUCOSE: 117 MG/DL — HIGH (ref 68–114)
GLUCOSE SERPL-MCNC: 98 MG/DL — SIGNIFICANT CHANGE UP (ref 70–99)
GLUCOSE SERPL-MCNC: 98 MG/DL — SIGNIFICANT CHANGE UP (ref 70–99)
HCT VFR BLD CALC: 40.3 % — SIGNIFICANT CHANGE UP (ref 34.5–45)
HCT VFR BLD CALC: 40.3 % — SIGNIFICANT CHANGE UP (ref 34.5–45)
HGB BLD-MCNC: 13.5 G/DL — SIGNIFICANT CHANGE UP (ref 11.5–15.5)
HGB BLD-MCNC: 13.5 G/DL — SIGNIFICANT CHANGE UP (ref 11.5–15.5)
IMM GRANULOCYTES NFR BLD AUTO: 0.7 % — SIGNIFICANT CHANGE UP (ref 0–0.9)
IMM GRANULOCYTES NFR BLD AUTO: 0.7 % — SIGNIFICANT CHANGE UP (ref 0–0.9)
INR BLD: 0.88 RATIO — SIGNIFICANT CHANGE UP (ref 0.85–1.18)
INR BLD: 0.88 RATIO — SIGNIFICANT CHANGE UP (ref 0.85–1.18)
LYMPHOCYTES # BLD AUTO: 1.41 K/UL — SIGNIFICANT CHANGE UP (ref 1–3.3)
LYMPHOCYTES # BLD AUTO: 1.41 K/UL — SIGNIFICANT CHANGE UP (ref 1–3.3)
LYMPHOCYTES # BLD AUTO: 32.9 % — SIGNIFICANT CHANGE UP (ref 13–44)
LYMPHOCYTES # BLD AUTO: 32.9 % — SIGNIFICANT CHANGE UP (ref 13–44)
MCHC RBC-ENTMCNC: 31.5 PG — SIGNIFICANT CHANGE UP (ref 27–34)
MCHC RBC-ENTMCNC: 31.5 PG — SIGNIFICANT CHANGE UP (ref 27–34)
MCHC RBC-ENTMCNC: 33.5 G/DL — SIGNIFICANT CHANGE UP (ref 32–36)
MCHC RBC-ENTMCNC: 33.5 G/DL — SIGNIFICANT CHANGE UP (ref 32–36)
MCV RBC AUTO: 93.9 FL — SIGNIFICANT CHANGE UP (ref 80–100)
MCV RBC AUTO: 93.9 FL — SIGNIFICANT CHANGE UP (ref 80–100)
MONOCYTES # BLD AUTO: 0.44 K/UL — SIGNIFICANT CHANGE UP (ref 0–0.9)
MONOCYTES # BLD AUTO: 0.44 K/UL — SIGNIFICANT CHANGE UP (ref 0–0.9)
MONOCYTES NFR BLD AUTO: 10.3 % — SIGNIFICANT CHANGE UP (ref 2–14)
MONOCYTES NFR BLD AUTO: 10.3 % — SIGNIFICANT CHANGE UP (ref 2–14)
MRSA PCR RESULT.: SIGNIFICANT CHANGE UP
MRSA PCR RESULT.: SIGNIFICANT CHANGE UP
NEUTROPHILS # BLD AUTO: 2.25 K/UL — SIGNIFICANT CHANGE UP (ref 1.8–7.4)
NEUTROPHILS # BLD AUTO: 2.25 K/UL — SIGNIFICANT CHANGE UP (ref 1.8–7.4)
NEUTROPHILS NFR BLD AUTO: 52.6 % — SIGNIFICANT CHANGE UP (ref 43–77)
NEUTROPHILS NFR BLD AUTO: 52.6 % — SIGNIFICANT CHANGE UP (ref 43–77)
NRBC # BLD: 0 /100 WBCS — SIGNIFICANT CHANGE UP (ref 0–0)
NRBC # BLD: 0 /100 WBCS — SIGNIFICANT CHANGE UP (ref 0–0)
PLATELET # BLD AUTO: 231 K/UL — SIGNIFICANT CHANGE UP (ref 150–400)
PLATELET # BLD AUTO: 231 K/UL — SIGNIFICANT CHANGE UP (ref 150–400)
POTASSIUM SERPL-MCNC: 4.2 MMOL/L — SIGNIFICANT CHANGE UP (ref 3.5–5.3)
POTASSIUM SERPL-MCNC: 4.2 MMOL/L — SIGNIFICANT CHANGE UP (ref 3.5–5.3)
POTASSIUM SERPL-SCNC: 4.2 MMOL/L — SIGNIFICANT CHANGE UP (ref 3.5–5.3)
POTASSIUM SERPL-SCNC: 4.2 MMOL/L — SIGNIFICANT CHANGE UP (ref 3.5–5.3)
PROT SERPL-MCNC: 6.6 GM/DL — SIGNIFICANT CHANGE UP (ref 6–8.3)
PROT SERPL-MCNC: 6.6 GM/DL — SIGNIFICANT CHANGE UP (ref 6–8.3)
PROTHROM AB SERPL-ACNC: 10.6 SEC — SIGNIFICANT CHANGE UP (ref 9.5–13)
PROTHROM AB SERPL-ACNC: 10.6 SEC — SIGNIFICANT CHANGE UP (ref 9.5–13)
RBC # BLD: 4.29 M/UL — SIGNIFICANT CHANGE UP (ref 3.8–5.2)
RBC # BLD: 4.29 M/UL — SIGNIFICANT CHANGE UP (ref 3.8–5.2)
RBC # FLD: 13.2 % — SIGNIFICANT CHANGE UP (ref 10.3–14.5)
RBC # FLD: 13.2 % — SIGNIFICANT CHANGE UP (ref 10.3–14.5)
S AUREUS DNA NOSE QL NAA+PROBE: DETECTED
S AUREUS DNA NOSE QL NAA+PROBE: DETECTED
SODIUM SERPL-SCNC: 141 MMOL/L — SIGNIFICANT CHANGE UP (ref 135–145)
SODIUM SERPL-SCNC: 141 MMOL/L — SIGNIFICANT CHANGE UP (ref 135–145)
WBC # BLD: 4.28 K/UL — SIGNIFICANT CHANGE UP (ref 3.8–10.5)
WBC # BLD: 4.28 K/UL — SIGNIFICANT CHANGE UP (ref 3.8–10.5)
WBC # FLD AUTO: 4.28 K/UL — SIGNIFICANT CHANGE UP (ref 3.8–10.5)
WBC # FLD AUTO: 4.28 K/UL — SIGNIFICANT CHANGE UP (ref 3.8–10.5)

## 2023-12-07 PROCEDURE — 93010 ELECTROCARDIOGRAM REPORT: CPT

## 2023-12-07 RX ORDER — SODIUM CHLORIDE 9 MG/ML
3 INJECTION INTRAMUSCULAR; INTRAVENOUS; SUBCUTANEOUS EVERY 8 HOURS
Refills: 0 | Status: DISCONTINUED | OUTPATIENT
Start: 2023-12-27 | End: 2023-12-28

## 2023-12-07 NOTE — H&P PST ADULT - ASSESSMENT
83F pmhx gerd, anxiety c/o right hip pain 2/2 unilateral primary osteoarthritis here for PST for scheduled Right total hip arthroplasty with Dr. Jolly on 23  CAPRINI SCORE    AGE RELATED RISK FACTORS                                                       MOBILITY RELATED FACTORS  [ ] Age 41-60 years                                            (1 Point)                  [ ] Bed rest                                                        (1 Point)  [ ] Age: 61-74 years                                           (2 Points)                [ ] Plaster cast                                                   (2 Points)  [ x] Age= 75 years                                              (3 Points)                 [ ] Bed bound for more than 72 hours                   (2 Points)    DISEASE RELATED RISK FACTORS                                               GENDER SPECIFIC FACTORS  [ ] Edema in the lower extremities                       (1 Point)                  [ ] Pregnancy                                                     (1 Point)  [ ] Varicose veins                                               (1 Point)                  [ ] Post-partum < 6 weeks                                   (1 Point)             [ ] BMI > 25 Kg/m2                                            (1 Point)                  [ ] Hormonal therapy  or oral contraception            (1 Point)                 [ ] Sepsis (in the previous month)                        (1 Point)                  [ ] History of pregnancy complications  [ ] Pneumonia or serious lung disease                                               [ ] Unexplained or recurrent                       (1 Point)           (in the previous month)                               (1 Point)  [ ] Abnormal pulmonary function test                     (1 Point)                 SURGERY RELATED RISK FACTORS  [ ] Acute myocardial infarction                              (1 Point)                 [ ]  Section                                            (1 Point)  [ ] Congestive heart failure (in the previous month)  (1 Point)                 [ ] Minor surgery                                                 (1 Point)   [ ] Inflammatory bowel disease                             (1 Point)                 [ ] Arthroscopic surgery                                        (2 Points)  [ ] Central venous access                                    (2 Points)                [ ] General surgery lasting more than 45 minutes   (2 Points)       [ ] Stroke (in the previous month)                          (5 Points)               [x ] Elective arthroplasty                                        (5 Points)                                                                                                                                               HEMATOLOGY RELATED FACTORS                                                 TRAUMA RELATED RISK FACTORS  [ ] Prior episodes of VTE                                     (3 Points)                 [ ] Fracture of the hip, pelvis, or leg                       (5 Points)  [ ] Positive family history for VTE                         (3 Points)                 [ ] Acute spinal cord injury (in the previous month)  (5 Points)  [ ] Prothrombin 41296 A                                      (3 Points)                 [ ] Paralysis  (less than 1 month)                          (5 Points)  [ ] Factor V Leiden                                             (3 Points)                 [ ] Multiple Trauma within 1 month                         (5 Points)  [ ] Lupus anticoagulants                                     (3 Points)                                                           [ ] Anticardiolipin antibodies                                (3 Points)                                                       [ ] High homocysteine in the blood                      (3 Points)                                             [ ] Other congenital or acquired thrombophilia       (3 Points)                                                [ ] Heparin induced thrombocytopenia                  (3 Points)                                          Total Score [    8      ] 83F pmhx gerd, anxiety c/o right hip pain 2/2 unilateral primary osteoarthritis here for PST for scheduled Right total hip arthroplasty with Dr. Jolly on 23  CAPRINI SCORE    AGE RELATED RISK FACTORS                                                       MOBILITY RELATED FACTORS  [ ] Age 41-60 years                                            (1 Point)                  [ ] Bed rest                                                        (1 Point)  [ ] Age: 61-74 years                                           (2 Points)                [ ] Plaster cast                                                   (2 Points)  [ x] Age= 75 years                                              (3 Points)                 [ ] Bed bound for more than 72 hours                   (2 Points)    DISEASE RELATED RISK FACTORS                                               GENDER SPECIFIC FACTORS  [ ] Edema in the lower extremities                       (1 Point)                  [ ] Pregnancy                                                     (1 Point)  [ ] Varicose veins                                               (1 Point)                  [ ] Post-partum < 6 weeks                                   (1 Point)             [ ] BMI > 25 Kg/m2                                            (1 Point)                  [ ] Hormonal therapy  or oral contraception            (1 Point)                 [ ] Sepsis (in the previous month)                        (1 Point)                  [ ] History of pregnancy complications  [ ] Pneumonia or serious lung disease                                               [ ] Unexplained or recurrent                       (1 Point)           (in the previous month)                               (1 Point)  [ ] Abnormal pulmonary function test                     (1 Point)                 SURGERY RELATED RISK FACTORS  [ ] Acute myocardial infarction                              (1 Point)                 [ ]  Section                                            (1 Point)  [ ] Congestive heart failure (in the previous month)  (1 Point)                 [ ] Minor surgery                                                 (1 Point)   [ ] Inflammatory bowel disease                             (1 Point)                 [ ] Arthroscopic surgery                                        (2 Points)  [ ] Central venous access                                    (2 Points)                [ ] General surgery lasting more than 45 minutes   (2 Points)       [ ] Stroke (in the previous month)                          (5 Points)               [x ] Elective arthroplasty                                        (5 Points)                                                                                                                                               HEMATOLOGY RELATED FACTORS                                                 TRAUMA RELATED RISK FACTORS  [ ] Prior episodes of VTE                                     (3 Points)                 [ ] Fracture of the hip, pelvis, or leg                       (5 Points)  [ ] Positive family history for VTE                         (3 Points)                 [ ] Acute spinal cord injury (in the previous month)  (5 Points)  [ ] Prothrombin 58932 A                                      (3 Points)                 [ ] Paralysis  (less than 1 month)                          (5 Points)  [ ] Factor V Leiden                                             (3 Points)                 [ ] Multiple Trauma within 1 month                         (5 Points)  [ ] Lupus anticoagulants                                     (3 Points)                                                           [ ] Anticardiolipin antibodies                                (3 Points)                                                       [ ] High homocysteine in the blood                      (3 Points)                                             [ ] Other congenital or acquired thrombophilia       (3 Points)                                                [ ] Heparin induced thrombocytopenia                  (3 Points)                                          Total Score [    8      ]

## 2023-12-07 NOTE — H&P PST ADULT - NSANTHOSAYNRD_GEN_A_CORE
No. SOTO screening performed.  STOP BANG Legend: 0-2 = LOW Risk; 3-4 = INTERMEDIATE Risk; 5-8 = HIGH Risk

## 2023-12-07 NOTE — H&P PST ADULT - HISTORY OF PRESENT ILLNESS
83F pmhx gerd, anxiety c/o right hip pain 2/2 unilateral primary osteoarthritis here for PST for scheduled Right total hip arthroplasty with Dr. Jolly on 12-27-23

## 2023-12-07 NOTE — OCCUPATIONAL THERAPY INITIAL EVALUATION ADULT - ADDITIONAL COMMENTS
Pt lives with  (Who can assist post op) in a private house with 3-4 steps to enter with bilateral handrails (Close together). Once inside, the pt has 5 steps with bilateral handrails (close together) to reach first floor and then 6 steps with bilateral handrails (close together) to reach main floor where bedroom and bathroom is. The pts bathroom has a walk in shower stall, fixed/retractable shower head, standard toilet seat and no grab bars. The pt reports that a 3/1 commode can fit over the toilet at home. The pt daily pain is a 7/10 at rest and a 10/10 with movement. The pt manages the pain with rest and does not take any pain medications. The pt has no recent outpatient PT, no recent falls and has buckling of the knees. The pt wears glasses for reading, R handed, drives and has hearing impairment in L ear (No hearing aid).

## 2023-12-07 NOTE — OCCUPATIONAL THERAPY INITIAL EVALUATION ADULT - PERTINENT HX OF CURRENT PROBLEM, REHAB EVAL
R hip OA which impacts pts ability to perform functional tasks/transfers and mobility. Pt is scheduled for R THR posterior approach 12/27/23.

## 2023-12-07 NOTE — H&P PST ADULT - NS PRO FEM REPRO PAP RESULTS
Hello, just wanted to give an update about the Endocrinology referral. I have not been able to reach them to set up a time for their appointment. Order will be removed from workqueue.   normal

## 2023-12-07 NOTE — H&P PST ADULT - PROBLEM SELECTOR PLAN 1
Right total hip arthroplasty  labs - cbc,pt/ptt,bmp,t&s,nose cx,ekg  M/C required  preop 3 day hibiclens instruction reviewed and given .instructed on if  nose cx positive use mupuricin 5 days and checklist given  take routine meds DOS with sips of water. avoid NSAID and OTC supplements. verbalized understanding  information on proper nutrition , increase protein and better food choices provided in packet   ensure clear given  Anesthesiologist to review PST labs, EKG, required clearances and optimization for surgery.

## 2023-12-07 NOTE — OCCUPATIONAL THERAPY INITIAL EVALUATION ADULT - TOILETING, PREVIOUS LEVEL OF FUNCTION, OT EVAL
Routing refill request to provider for review/approval because:  Drug not on the [facility] refill protocol   A break in medication: ambien ordered 2 yrs ago.       Angela Li RN Flex          
independent

## 2023-12-08 LAB
VIT D25+D1,25 OH+D1,25 PNL SERPL-MCNC: 71.7 PG/ML — SIGNIFICANT CHANGE UP (ref 19.9–79.3)
VIT D25+D1,25 OH+D1,25 PNL SERPL-MCNC: 71.7 PG/ML — SIGNIFICANT CHANGE UP (ref 19.9–79.3)

## 2023-12-08 RX ORDER — MUPIROCIN 20 MG/G
1 OINTMENT TOPICAL
Qty: 1 | Refills: 0
Start: 2023-12-08 | End: 2023-12-12

## 2023-12-10 ENCOUNTER — NON-APPOINTMENT (OUTPATIENT)
Age: 83
End: 2023-12-10

## 2023-12-12 ENCOUNTER — APPOINTMENT (OUTPATIENT)
Dept: ORTHOPEDIC SURGERY | Facility: CLINIC | Age: 83
End: 2023-12-12

## 2023-12-18 ENCOUNTER — NON-APPOINTMENT (OUTPATIENT)
Age: 83
End: 2023-12-18

## 2023-12-26 ENCOUNTER — TRANSCRIPTION ENCOUNTER (OUTPATIENT)
Age: 83
End: 2023-12-26

## 2023-12-26 RX ORDER — OXYCODONE HYDROCHLORIDE 5 MG/1
5 TABLET ORAL EVERY 4 HOURS
Refills: 0 | Status: DISCONTINUED | OUTPATIENT
Start: 2023-12-27 | End: 2023-12-28

## 2023-12-26 RX ORDER — ACETAMINOPHEN 500 MG
650 TABLET ORAL EVERY 6 HOURS
Refills: 0 | Status: DISCONTINUED | OUTPATIENT
Start: 2023-12-27 | End: 2023-12-28

## 2023-12-26 RX ORDER — SERTRALINE 25 MG/1
25 TABLET, FILM COATED ORAL DAILY
Refills: 0 | Status: DISCONTINUED | OUTPATIENT
Start: 2023-12-27 | End: 2023-12-28

## 2023-12-26 RX ORDER — ONDANSETRON 8 MG/1
4 TABLET, FILM COATED ORAL EVERY 6 HOURS
Refills: 0 | Status: DISCONTINUED | OUTPATIENT
Start: 2023-12-27 | End: 2023-12-28

## 2023-12-26 RX ORDER — ASPIRIN/CALCIUM CARB/MAGNESIUM 324 MG
81 TABLET ORAL
Refills: 0 | Status: DISCONTINUED | OUTPATIENT
Start: 2023-12-28 | End: 2023-12-28

## 2023-12-26 RX ORDER — HYDROMORPHONE HYDROCHLORIDE 2 MG/ML
0.5 INJECTION INTRAMUSCULAR; INTRAVENOUS; SUBCUTANEOUS
Refills: 0 | Status: DISCONTINUED | OUTPATIENT
Start: 2023-12-27 | End: 2023-12-28

## 2023-12-26 RX ORDER — PANTOPRAZOLE SODIUM 20 MG/1
40 TABLET, DELAYED RELEASE ORAL
Refills: 0 | Status: DISCONTINUED | OUTPATIENT
Start: 2023-12-27 | End: 2023-12-28

## 2023-12-26 RX ORDER — SODIUM CHLORIDE 9 MG/ML
1000 INJECTION, SOLUTION INTRAVENOUS
Refills: 0 | Status: DISCONTINUED | OUTPATIENT
Start: 2023-12-27 | End: 2023-12-28

## 2023-12-26 RX ORDER — OXYCODONE HYDROCHLORIDE 5 MG/1
10 TABLET ORAL EVERY 4 HOURS
Refills: 0 | Status: DISCONTINUED | OUTPATIENT
Start: 2023-12-27 | End: 2023-12-28

## 2023-12-26 RX ORDER — CELECOXIB 200 MG/1
200 CAPSULE ORAL EVERY 12 HOURS
Refills: 0 | Status: DISCONTINUED | OUTPATIENT
Start: 2023-12-28 | End: 2023-12-28

## 2023-12-26 RX ORDER — HYDROXYZINE HCL 10 MG
25 TABLET ORAL DAILY
Refills: 0 | Status: DISCONTINUED | OUTPATIENT
Start: 2023-12-27 | End: 2023-12-28

## 2023-12-26 RX ORDER — SENNA PLUS 8.6 MG/1
2 TABLET ORAL AT BEDTIME
Refills: 0 | Status: DISCONTINUED | OUTPATIENT
Start: 2023-12-27 | End: 2023-12-28

## 2023-12-26 RX ORDER — LANOLIN ALCOHOL/MO/W.PET/CERES
3 CREAM (GRAM) TOPICAL AT BEDTIME
Refills: 0 | Status: DISCONTINUED | OUTPATIENT
Start: 2023-12-27 | End: 2023-12-28

## 2023-12-26 RX ORDER — POLYETHYLENE GLYCOL 3350 17 G/17G
17 POWDER, FOR SOLUTION ORAL AT BEDTIME
Refills: 0 | Status: DISCONTINUED | OUTPATIENT
Start: 2023-12-27 | End: 2023-12-28

## 2023-12-26 RX ORDER — ASCORBIC ACID 60 MG
500 TABLET,CHEWABLE ORAL
Refills: 0 | Status: DISCONTINUED | OUTPATIENT
Start: 2023-12-27 | End: 2023-12-28

## 2023-12-27 ENCOUNTER — TRANSCRIPTION ENCOUNTER (OUTPATIENT)
Age: 83
End: 2023-12-27

## 2023-12-27 ENCOUNTER — APPOINTMENT (OUTPATIENT)
Dept: ORTHOPEDIC SURGERY | Facility: HOSPITAL | Age: 83
End: 2023-12-27
Payer: MEDICARE

## 2023-12-27 ENCOUNTER — INPATIENT (INPATIENT)
Facility: HOSPITAL | Age: 83
LOS: 0 days | Discharge: HOME | End: 2023-12-28
Attending: ORTHOPAEDIC SURGERY | Admitting: ORTHOPAEDIC SURGERY
Payer: MEDICARE

## 2023-12-27 VITALS
SYSTOLIC BLOOD PRESSURE: 133 MMHG | TEMPERATURE: 98 F | DIASTOLIC BLOOD PRESSURE: 79 MMHG | OXYGEN SATURATION: 96 % | HEART RATE: 79 BPM | HEIGHT: 65 IN | WEIGHT: 151.24 LBS | RESPIRATION RATE: 14 BRPM

## 2023-12-27 DIAGNOSIS — Z98.89 OTHER SPECIFIED POSTPROCEDURAL STATES: Chronic | ICD-10-CM

## 2023-12-27 LAB
ANION GAP SERPL CALC-SCNC: 4 MMOL/L — LOW (ref 5–17)
ANION GAP SERPL CALC-SCNC: 4 MMOL/L — LOW (ref 5–17)
BLD GP AB SCN SERPL QL: SIGNIFICANT CHANGE UP
BLD GP AB SCN SERPL QL: SIGNIFICANT CHANGE UP
BUN SERPL-MCNC: 14 MG/DL — SIGNIFICANT CHANGE UP (ref 7–23)
BUN SERPL-MCNC: 14 MG/DL — SIGNIFICANT CHANGE UP (ref 7–23)
CALCIUM SERPL-MCNC: 9.1 MG/DL — SIGNIFICANT CHANGE UP (ref 8.5–10.1)
CALCIUM SERPL-MCNC: 9.1 MG/DL — SIGNIFICANT CHANGE UP (ref 8.5–10.1)
CHLORIDE SERPL-SCNC: 113 MMOL/L — HIGH (ref 96–108)
CHLORIDE SERPL-SCNC: 113 MMOL/L — HIGH (ref 96–108)
CO2 SERPL-SCNC: 25 MMOL/L — SIGNIFICANT CHANGE UP (ref 22–31)
CO2 SERPL-SCNC: 25 MMOL/L — SIGNIFICANT CHANGE UP (ref 22–31)
CREAT SERPL-MCNC: 0.68 MG/DL — SIGNIFICANT CHANGE UP (ref 0.5–1.3)
CREAT SERPL-MCNC: 0.68 MG/DL — SIGNIFICANT CHANGE UP (ref 0.5–1.3)
EGFR: 87 ML/MIN/1.73M2 — SIGNIFICANT CHANGE UP
EGFR: 87 ML/MIN/1.73M2 — SIGNIFICANT CHANGE UP
GLUCOSE SERPL-MCNC: 122 MG/DL — HIGH (ref 70–99)
GLUCOSE SERPL-MCNC: 122 MG/DL — HIGH (ref 70–99)
HCT VFR BLD CALC: 36.5 % — SIGNIFICANT CHANGE UP (ref 34.5–45)
HCT VFR BLD CALC: 36.5 % — SIGNIFICANT CHANGE UP (ref 34.5–45)
HGB BLD-MCNC: 12.1 G/DL — SIGNIFICANT CHANGE UP (ref 11.5–15.5)
HGB BLD-MCNC: 12.1 G/DL — SIGNIFICANT CHANGE UP (ref 11.5–15.5)
MCHC RBC-ENTMCNC: 31.2 PG — SIGNIFICANT CHANGE UP (ref 27–34)
MCHC RBC-ENTMCNC: 31.2 PG — SIGNIFICANT CHANGE UP (ref 27–34)
MCHC RBC-ENTMCNC: 33.2 G/DL — SIGNIFICANT CHANGE UP (ref 32–36)
MCHC RBC-ENTMCNC: 33.2 G/DL — SIGNIFICANT CHANGE UP (ref 32–36)
MCV RBC AUTO: 94.1 FL — SIGNIFICANT CHANGE UP (ref 80–100)
MCV RBC AUTO: 94.1 FL — SIGNIFICANT CHANGE UP (ref 80–100)
NRBC # BLD: 0 /100 WBCS — SIGNIFICANT CHANGE UP (ref 0–0)
NRBC # BLD: 0 /100 WBCS — SIGNIFICANT CHANGE UP (ref 0–0)
PLATELET # BLD AUTO: 202 K/UL — SIGNIFICANT CHANGE UP (ref 150–400)
PLATELET # BLD AUTO: 202 K/UL — SIGNIFICANT CHANGE UP (ref 150–400)
POTASSIUM SERPL-MCNC: 4.4 MMOL/L — SIGNIFICANT CHANGE UP (ref 3.5–5.3)
POTASSIUM SERPL-MCNC: 4.4 MMOL/L — SIGNIFICANT CHANGE UP (ref 3.5–5.3)
POTASSIUM SERPL-SCNC: 4.4 MMOL/L — SIGNIFICANT CHANGE UP (ref 3.5–5.3)
POTASSIUM SERPL-SCNC: 4.4 MMOL/L — SIGNIFICANT CHANGE UP (ref 3.5–5.3)
RBC # BLD: 3.88 M/UL — SIGNIFICANT CHANGE UP (ref 3.8–5.2)
RBC # BLD: 3.88 M/UL — SIGNIFICANT CHANGE UP (ref 3.8–5.2)
RBC # FLD: 12.9 % — SIGNIFICANT CHANGE UP (ref 10.3–14.5)
RBC # FLD: 12.9 % — SIGNIFICANT CHANGE UP (ref 10.3–14.5)
SODIUM SERPL-SCNC: 142 MMOL/L — SIGNIFICANT CHANGE UP (ref 135–145)
SODIUM SERPL-SCNC: 142 MMOL/L — SIGNIFICANT CHANGE UP (ref 135–145)
WBC # BLD: 5.49 K/UL — SIGNIFICANT CHANGE UP (ref 3.8–10.5)
WBC # BLD: 5.49 K/UL — SIGNIFICANT CHANGE UP (ref 3.8–10.5)
WBC # FLD AUTO: 5.49 K/UL — SIGNIFICANT CHANGE UP (ref 3.8–10.5)
WBC # FLD AUTO: 5.49 K/UL — SIGNIFICANT CHANGE UP (ref 3.8–10.5)

## 2023-12-27 PROCEDURE — 27130 TOTAL HIP ARTHROPLASTY: CPT | Mod: AS,RT

## 2023-12-27 PROCEDURE — 27130 TOTAL HIP ARTHROPLASTY: CPT | Mod: RT

## 2023-12-27 PROCEDURE — 73501 X-RAY EXAM HIP UNI 1 VIEW: CPT | Mod: 26,RT

## 2023-12-27 PROCEDURE — 20985 CPTR-ASST DIR MS PX: CPT

## 2023-12-27 DEVICE — INSERT ACET OR3O DUAL MOBILITY 28/40MM: Type: IMPLANTABLE DEVICE | Site: RIGHT | Status: FUNCTIONAL

## 2023-12-27 DEVICE — OXINIUM 28MM FERMORAL HEAD ZR 2.5 NB: Type: IMPLANTABLE DEVICE | Site: RIGHT | Status: FUNCTIONAL

## 2023-12-27 DEVICE — IMPLANTABLE DEVICE: Type: IMPLANTABLE DEVICE | Site: RIGHT | Status: FUNCTIONAL

## 2023-12-27 DEVICE — LINER ACET OR3O DM XLPE 40/52MM: Type: IMPLANTABLE DEVICE | Site: RIGHT | Status: FUNCTIONAL

## 2023-12-27 DEVICE — SHELL ACET R3 POLY STD 3 HOLE 52MM: Type: IMPLANTABLE DEVICE | Site: RIGHT | Status: FUNCTIONAL

## 2023-12-27 RX ORDER — SERTRALINE 25 MG/1
1 TABLET, FILM COATED ORAL
Refills: 0 | DISCHARGE

## 2023-12-27 RX ORDER — ACETAMINOPHEN 500 MG
650 TABLET ORAL ONCE
Refills: 0 | Status: COMPLETED | OUTPATIENT
Start: 2023-12-27 | End: 2023-12-27

## 2023-12-27 RX ORDER — ACETAMINOPHEN 500 MG
1000 TABLET ORAL ONCE
Refills: 0 | Status: COMPLETED | OUTPATIENT
Start: 2023-12-27 | End: 2023-12-28

## 2023-12-27 RX ORDER — SODIUM CHLORIDE 9 MG/ML
1000 INJECTION, SOLUTION INTRAVENOUS
Refills: 0 | Status: DISCONTINUED | OUTPATIENT
Start: 2023-12-27 | End: 2023-12-27

## 2023-12-27 RX ORDER — PREGABALIN 225 MG/1
1 CAPSULE ORAL
Refills: 0 | DISCHARGE

## 2023-12-27 RX ORDER — LANOLIN ALCOHOL/MO/W.PET/CERES
3 CREAM (GRAM) TOPICAL AT BEDTIME
Refills: 0 | Status: DISCONTINUED | OUTPATIENT
Start: 2023-12-27 | End: 2023-12-28

## 2023-12-27 RX ORDER — FENTANYL CITRATE 50 UG/ML
25 INJECTION INTRAVENOUS ONCE
Refills: 0 | Status: DISCONTINUED | OUTPATIENT
Start: 2023-12-27 | End: 2023-12-27

## 2023-12-27 RX ORDER — ACETAMINOPHEN 500 MG
1000 TABLET ORAL ONCE
Refills: 0 | Status: COMPLETED | OUTPATIENT
Start: 2023-12-27 | End: 2023-12-27

## 2023-12-27 RX ORDER — CEFAZOLIN SODIUM 1 G
2000 VIAL (EA) INJECTION EVERY 8 HOURS
Refills: 0 | Status: COMPLETED | OUTPATIENT
Start: 2023-12-27 | End: 2023-12-28

## 2023-12-27 RX ORDER — ACETAMINOPHEN 500 MG
1000 TABLET ORAL ONCE
Refills: 0 | Status: COMPLETED | OUTPATIENT
Start: 2023-12-27 | End: 2024-11-24

## 2023-12-27 RX ORDER — SODIUM CHLORIDE 9 MG/ML
1000 INJECTION, SOLUTION INTRAVENOUS ONCE
Refills: 0 | Status: COMPLETED | OUTPATIENT
Start: 2023-12-27 | End: 2023-12-27

## 2023-12-27 RX ORDER — ONDANSETRON 8 MG/1
4 TABLET, FILM COATED ORAL ONCE
Refills: 0 | Status: DISCONTINUED | OUTPATIENT
Start: 2023-12-27 | End: 2023-12-27

## 2023-12-27 RX ORDER — FENTANYL CITRATE 50 UG/ML
50 INJECTION INTRAVENOUS ONCE
Refills: 0 | Status: DISCONTINUED | OUTPATIENT
Start: 2023-12-27 | End: 2023-12-27

## 2023-12-27 RX ORDER — HYDROXYZINE HCL 10 MG
2 TABLET ORAL
Refills: 0 | DISCHARGE

## 2023-12-27 RX ORDER — FAMOTIDINE 10 MG/ML
1 INJECTION INTRAVENOUS
Refills: 0 | DISCHARGE

## 2023-12-27 RX ORDER — DEXAMETHASONE 0.5 MG/5ML
10 ELIXIR ORAL ONCE
Refills: 0 | Status: COMPLETED | OUTPATIENT
Start: 2023-12-28 | End: 2023-12-28

## 2023-12-27 RX ADMIN — OXYCODONE HYDROCHLORIDE 10 MILLIGRAM(S): 5 TABLET ORAL at 16:01

## 2023-12-27 RX ADMIN — OXYCODONE HYDROCHLORIDE 10 MILLIGRAM(S): 5 TABLET ORAL at 21:44

## 2023-12-27 RX ADMIN — SODIUM CHLORIDE 3 MILLILITER(S): 9 INJECTION INTRAMUSCULAR; INTRAVENOUS; SUBCUTANEOUS at 22:48

## 2023-12-27 RX ADMIN — SODIUM CHLORIDE 115 MILLILITER(S): 9 INJECTION, SOLUTION INTRAVENOUS at 15:03

## 2023-12-27 RX ADMIN — OXYCODONE HYDROCHLORIDE 10 MILLIGRAM(S): 5 TABLET ORAL at 17:00

## 2023-12-27 RX ADMIN — Medication 500 MILLIGRAM(S): at 17:49

## 2023-12-27 RX ADMIN — Medication 25 MILLIGRAM(S): at 21:47

## 2023-12-27 RX ADMIN — Medication 400 MILLIGRAM(S): at 17:49

## 2023-12-27 RX ADMIN — Medication 650 MILLIGRAM(S): at 07:42

## 2023-12-27 RX ADMIN — POLYETHYLENE GLYCOL 3350 17 GRAM(S): 17 POWDER, FOR SOLUTION ORAL at 21:45

## 2023-12-27 RX ADMIN — SODIUM CHLORIDE 115 MILLILITER(S): 9 INJECTION, SOLUTION INTRAVENOUS at 21:44

## 2023-12-27 RX ADMIN — SENNA PLUS 2 TABLET(S): 8.6 TABLET ORAL at 21:44

## 2023-12-27 RX ADMIN — SODIUM CHLORIDE 75 MILLILITER(S): 9 INJECTION, SOLUTION INTRAVENOUS at 12:57

## 2023-12-27 RX ADMIN — SERTRALINE 25 MILLIGRAM(S): 25 TABLET, FILM COATED ORAL at 21:46

## 2023-12-27 RX ADMIN — Medication 3 MILLIGRAM(S): at 21:45

## 2023-12-27 RX ADMIN — Medication 100 MILLIGRAM(S): at 17:49

## 2023-12-27 RX ADMIN — OXYCODONE HYDROCHLORIDE 10 MILLIGRAM(S): 5 TABLET ORAL at 22:44

## 2023-12-27 RX ADMIN — Medication 1000 MILLIGRAM(S): at 18:49

## 2023-12-27 RX ADMIN — SODIUM CHLORIDE 1000 MILLILITER(S): 9 INJECTION, SOLUTION INTRAVENOUS at 12:25

## 2023-12-27 NOTE — PHYSICAL THERAPY INITIAL EVALUATION ADULT - IMPAIRMENTS FOUND, PT EVAL
aerobic capacity/endurance/gait, locomotion, and balance/joint integrity and mobility/muscle strength/neuromotor development and sensory integration/posture/ROM

## 2023-12-27 NOTE — OCCUPATIONAL THERAPY INITIAL EVALUATION ADULT - RANGE OF MOTION EXAMINATION, LOWER EXTREMITY
right LE hip ROM limited 2/2 pain and posterior hip precautions/Left LE Active ROM was WFL (within functional limits)

## 2023-12-27 NOTE — DISCHARGE NOTE PROVIDER - NSDCFUADDINST_GEN_ALL_CORE_FT
Keep CYNDEE Dressing Clean, Dry and Intact. May shower with CYNDEE Dressing. Please do not scrub, soak, peel or pick at the CYNDEE dressing. No creams, lotions, or oils over dressing. May shower and let water run over dressing, no baths. Pat dry once out of shower. Dressing to be removed in 7 days. If dressing is saturated from border to border - may remove and replace with clean dry dressing.    Shower instructions for CYNDEE Dressing: Turn battery pack off. Twist OFF battery pack before entering shower. Once done with showering. Pat dressing dry. Reconnect and twist ON battery pack after you are dry. Then turn battery pack on.     As per Dr. Jolly:  Patient does not need abduction pillow at home.  Patient can sleep with regular pillow between their knees.  Patient can sleep up on non operative side.    Hip replacement precautions: Abduction pillow. Elevate the leg (while keeping hip precautions) as often as possible to help control swelling.

## 2023-12-27 NOTE — DISCHARGE NOTE PROVIDER - NSDCFUSCHEDAPPT_GEN_ALL_CORE_FT
Brian Jolly  [facility]  [address]   Scheduled Appointment: 12/27/2023    Brian Jolly  [facility]  [address]  Scheduled Appointment: 01/09/2024    Andreas Albright  [facility]  [address] [facility]  Scheduled Appointment: 02/01/2024     Brian Jolly  [facility]  [address]   Scheduled Appointment: 12/27/2023    Brian Jolly  [facility]  [address]  Scheduled Appointment: 01/09/2024    Andreas Albright  [facility]  [address] [facility]  Scheduled Appointment: 02/01/2024     Brian Jolly  [facility]  [address]   Scheduled Appointment: 01/09/2024    Andreas Albright  [facility]  [address] [facility]  Scheduled Appointment: 02/01/2024     Brian Jolly  [facility]  [address]   Scheduled Appointment: 01/09/2024    Andreas Albright  [facility]  [address] [facility]  Scheduled Appointment: 02/01/2024

## 2023-12-27 NOTE — DISCHARGE NOTE PROVIDER - CARE PROVIDER_API CALL
Brian Jolly Mode  Orthopaedic Surgery  [address] [place] [id number]  Phone: [phone]  Fax: [phone]  Follow Up Time:    Brian Jolly Mode  Orthopaedic Surgery  [address] [place] [id number]  Phone: [phone]  Fax: [phone]  Follow Up Time:

## 2023-12-27 NOTE — CONSULT NOTE ADULT - SUBJECTIVE AND OBJECTIVE BOX
JOHANNE JOSHI is a 83y Female s/p RIGHT TOTAL HIP ARTHROPLASTY      w/ h/o No pertinent past medical history    RA (rheumatoid arthritis)    No pertinent past medical history    GERD (gastroesophageal reflux disease)      denies any chest pain shortness of breath palpitation dizziness lightheadedness nausea vomiting fever or chills    H/O varicose vein ligation    No significant past surgical history      Family history of diabetes mellitus (Child)    Family history of lung cancer (Mother)    Family history of emphysema (Father)      SH: doesnot smoke or drink at this time    Cipro (Diarrhea)  Voltaren (Stomach Upset; Diarrhea)  Cipro (Unknown)  penicillin (Hives; Angioedema)    acetaminophen     Tablet .. 650 milliGRAM(s) Oral every 6 hours  acetaminophen   IVPB .. 1000 milliGRAM(s) IV Intermittent once  ascorbic acid 500 milliGRAM(s) Oral two times a day  ceFAZolin   IVPB 2000 milliGRAM(s) IV Intermittent every 8 hours  HYDROmorphone  Injectable 0.5 milliGRAM(s) IV Push every 3 hours PRN  hydrOXYzine hydrochloride 25 milliGRAM(s) Oral daily  lactated ringers. 1000 milliLiter(s) IV Continuous <Continuous>  melatonin 3 milliGRAM(s) Oral at bedtime  melatonin 3 milliGRAM(s) Oral at bedtime PRN  multivitamin 1 Tablet(s) Oral daily  ondansetron Injectable 4 milliGRAM(s) IV Push every 6 hours PRN  oxyCODONE    IR 10 milliGRAM(s) Oral every 4 hours PRN  oxyCODONE    IR 5 milliGRAM(s) Oral every 4 hours PRN  pantoprazole    Tablet 40 milliGRAM(s) Oral before breakfast  polyethylene glycol 3350 17 Gram(s) Oral at bedtime  senna 2 Tablet(s) Oral at bedtime  sertraline 25 milliGRAM(s) Oral daily  sodium chloride 0.9% lock flush 3 milliLiter(s) IV Push every 8 hours    T(C): 36.6 (12-27-23 @ 17:40), Max: 36.6 (12-27-23 @ 15:40)  HR: 85 (12-27-23 @ 17:40) (56 - 85)  BP: 102/68 (12-27-23 @ 17:40) (78/68 - 133/79)  RR: 16 (12-27-23 @ 17:40) (12 - 18)  SpO2: 94% (12-27-23 @ 17:40) (94% - 100%)  HEENT unremarkable  neck no JVD or bruit  heart normal S1 S2 RRR no gallops or rubs  chest clear to auscultation  abd sof nontender non distended +bs  ext no calf tenderness    A/P   DVT PX  pain control  bowel regimen   wound care as per ortho  GI PX  antiemetics prn  incentive spirometer

## 2023-12-27 NOTE — OCCUPATIONAL THERAPY INITIAL EVALUATION ADULT - STRENGTHENING, PT EVAL
Patient will increase right lower extremity strength to 4/5 to improve functional strength needed to engage in functional tasks by 4 weeks

## 2023-12-27 NOTE — PHYSICAL THERAPY INITIAL EVALUATION ADULT - RANGE OF MOTION EXAMINATION, REHAB EVAL
except right hip limited due to pain and precautions./bilateral upper extremity ROM was WFL (within functional limits)/bilateral lower extremity ROM was WFL (within functional limits)/deficits as listed below

## 2023-12-27 NOTE — DISCHARGE NOTE PROVIDER - NSDCFUADDAPPT_GEN_ALL_CORE_FT
Follow up with your surgeon in two weeks. Call for appointment.    If you need more pain medications, call your surgeon's office. For medication refills or authorizations call [phone] xt 2301    Make sure to have a bowel movement by 2 days after surgery. Take stool softners and laxatives as needed.    Call and schedule a follow up appointment with your primary care physician for repeat blood work (CBC and BMP) for post hospital discharge follow-up care.    Call your surgeon if you have increased redness/pain/drainage or fever. Return to ER for shortness of breath/calf tenderness. Follow up with your surgeon in two weeks. Call for appointment.    If you need more pain medications, call your surgeon's office. For medication refills or authorizations call [phone]    Make sure to have a bowel movement by 2 days after surgery. Take stool softners and laxatives as needed.    Call and schedule a follow up appointment with your primary care physician for repeat blood work (CBC and BMP) for post hospital discharge follow-up care.    Call your surgeon if you have increased redness/pain/drainage or fever. Return to ER for shortness of breath/calf tenderness.

## 2023-12-27 NOTE — DISCHARGE NOTE PROVIDER - HOSPITAL COURSE
83yFemale with history of OA presenting for R ORQUIDEA by Dr. Jolly on 12/27/2023. Risk and benefits of surgery were explained to the patient. The patient understood and agreed to proceed with surgery. Patient underwent the procedure with no intraoperative complications. Pt was brought in stable condition to the PACU. Once stable in PACU, pt was brought to the floor. During hospital stay pt was followed by Medicine, physical therapy, Home Care during this admission. Pt had an uneventful hospital course. Pt is stable for discharge to home 83yFemale with history of OA presenting for R ORQUIDEA by Dr. Jolly on 12/27/2023. Risk and benefits of surgery were explained to the patient. The patient understood and agreed to proceed with surgery. Patient underwent the procedure with no intraoperative complications. Pt was brought in stable condition to the PACU. Once stable in PACU, pt was brought to the floor. During hospital stay pt was followed by Medicine, physical therapy, Home Care during this admission. Pt had an uneventful hospital course. Pt is stable for discharge to home on POD#1.

## 2023-12-27 NOTE — ASU PREOP CHECKLIST - NS PREOP CHK CHLOROHEX WASH
in ASU: Pain Refusal Text: I offered to prescribe pain medication but the patient refused to take this medication.

## 2023-12-27 NOTE — PATIENT PROFILE ADULT - FUNCTIONAL ASSESSMENT - BASIC MOBILITY 4.
4 = No assist / stand by assistance How to get your Coronavirus (COVID-19) Testing Results:   Please be advised that you were tested for the coronavirus (COVID-19) in the Emergency Department at [facility].  You are to maintain self-quarantine procedures for 14 days until instructed otherwise by one of our healthcare agents. Please note that the test may take up to 2-4 days to result.  If you do not hear from us within 72 hours and you'd like to check on your results, you can call on of our coronavirus specialists at [address] [place] [address] (available 24/7).  Please DO NOT call the site where you received the test to obtain your results.

## 2023-12-27 NOTE — PATIENT PROFILE ADULT - NSPROEXTENSIONSOFSELF_GEN_A_NUR
Anesthesia Evaluation     Patient summary reviewed   no history of anesthetic complications:  NPO Solid Status: > 8 hours  NPO Liquid Status: > 8 hours           Airway   Mallampati: I  TM distance: >3 FB  Neck ROM: full  No difficulty expected  Dental - normal exam     Pulmonary    (-) asthma, sleep apnea, not a smoker  Cardiovascular   Exercise tolerance: good (4-7 METS)    ECG reviewed    (-) hypertension, past MI, CAD, dysrhythmias, cardiac stents, hyperlipidemia      Neuro/Psych  (-) seizures, TIA, CVA  GI/Hepatic/Renal/Endo    (+) obesity,   thyroid problem hypothyroidism  (-) liver disease, no renal disease, diabetes    Musculoskeletal     Abdominal    Substance History      OB/GYN          Other                        Anesthesia Plan    ASA 2     general     intravenous induction     Anesthetic plan, all risks, benefits, and alternatives have been provided, discussed and informed consent has been obtained with: patient.      
none

## 2023-12-27 NOTE — DISCHARGE NOTE PROVIDER - EXTENDED VTE YES NO FOR MLM ENOXAPARIN
Health Maintenance Summary     Topic Due On Due Status Completed On Postpone Until Reason    IMMUNIZATION - HPV  Completed Aug 22, 2011      Immunization - Td/Tdap Feb 5, 2020 Not Due Feb 5, 2010      Immunization - TDAP Pregnancy  Hidden       Immunization-Influenza Sep 1, 2016 Postponed Oct 1, 2015 Apr 1, 2017 Patient Refused          Patient is up to date, no discussion needed .       ,

## 2023-12-27 NOTE — PHYSICAL THERAPY INITIAL EVALUATION ADULT - PERTINENT HX OF CURRENT PROBLEM, REHAB EVAL
Patient is a 84 y/o female admitted to [facility] due to elective S/P Right ORQUIDEA(posterior) POD#0. Patient is a 84 y/o female admitted to [facility] due to elective S/P Right ORUQIDEA(posterior) POD#0.

## 2023-12-27 NOTE — PATIENT PROFILE ADULT - FALL HARM RISK - HARM RISK INTERVENTIONS
Communicate Risk of Fall with Harm to all staff/Reinforce activity limits and safety measures with patient and family/Tailored Fall Risk Interventions/Visual Cue: Yellow wristband and red socks/Bed in lowest position, wheels locked, appropriate side rails in place/Call bell, personal items and telephone in reach/Instruct patient to call for assistance before getting out of bed or chair/Non-slip footwear when patient is out of bed/[place] to call system/Physically safe environment - no spills, clutter or unnecessary equipment/Purposeful Proactive Rounding/Room/bathroom lighting operational, light cord in reach Communicate Risk of Fall with Harm to all staff/Reinforce activity limits and safety measures with patient and family/Tailored Fall Risk Interventions/Visual Cue: Yellow wristband and red socks/Bed in lowest position, wheels locked, appropriate side rails in place/Call bell, personal items and telephone in reach/Instruct patient to call for assistance before getting out of bed or chair/Non-slip footwear when patient is out of bed/[place] to call system/Physically safe environment - no spills, clutter or unnecessary equipment/Purposeful Proactive Rounding/Room/bathroom lighting operational, light cord in reach

## 2023-12-27 NOTE — PHYSICAL THERAPY INITIAL EVALUATION ADULT - GENERAL OBSERVATIONS, REHAB EVAL
Chart (EMR) reviewed. VSS taken by PCA. OT Compa present. Received supine c HOB elevated, NAD. +heplock, +CYNDEE dressing to right hip intact, +B/L SCD's donned.  present. Alert. Ox4. Able to follow multistep commands/directions. Educated c hip precautions and provided ORQUIDEA(posterior) packet and green strap.

## 2023-12-27 NOTE — PHYSICAL THERAPY INITIAL EVALUATION ADULT - ADDITIONAL COMMENTS
Confirmed from pre-op notes: Pt lives with  (Who can assist post op) in a private house with 3 back entrance steps with R rail up. Once inside, the pt has 5 steps with bilateral handrails (close together). The pts bathroom has a walk in shower stall, fixed/retractable shower head, standard toilet seat and no grab bars. Pt was Independent c all ADL's and ambulation without device. Pt has own rolling walker, cane, and commode.

## 2023-12-27 NOTE — PHYSICAL THERAPY INITIAL EVALUATION ADULT - GAIT TRAINING, PT EVAL
Pt will improve ambulation to ~300 feet Independently using rolling walker within 2weeks. Pt will negotiate ~10 steps c rail Independently within 2 weeks.

## 2023-12-27 NOTE — OCCUPATIONAL THERAPY INITIAL EVALUATION ADULT - GENERAL OBSERVATIONS, REHAB EVAL
Pt encountered semisupine in bed, NAD, AXOX4, +heplock, +ling R hip c/d/i, +abduction pillow,  present, PT Brad present.

## 2023-12-27 NOTE — DISCHARGE NOTE PROVIDER - NSDCMRMEDTOKEN_GEN_ALL_CORE_FT
B-12 1000 mcg oral tablet: 1 tab(s) orally once a day  famotidine 20 mg oral tablet: 1 tab(s) orally once a day  hydrOXYzine hydrochloride 10 mg oral tablet: 2 tab(s) orally once a day  mupirocin 2% topical ointment: Apply topically to affected area 2 times a day  sertraline 25 mg oral tablet: 1 tab(s) orally once a day   acetaminophen 325 mg oral tablet: 2 tab(s) orally every 6 hours  ascorbic acid 500 mg oral tablet: 1 tab(s) orally 2 times a day  aspirin 81 mg oral delayed release tablet: 1 tab(s) orally 2 times a day MDD: 2  aspirin 81 mg oral delayed release tablet: 1 tab(s) orally 2 times a day MDD: 2  B-12 1000 mcg oral tablet: 1 tab(s) orally once a day  celecoxib 200 mg oral capsule: 1 cap(s) orally every 12 hours  celecoxib 200 mg oral capsule: 1 cap(s) orally every 12 hours  famotidine 20 mg oral tablet: 1 tab(s) orally once a day  hydrOXYzine hydrochloride 10 mg oral tablet: 2 tab(s) orally once a day  Multiple Vitamins oral tablet: 1 tab(s) orally once a day  Narcan 4 mg/0.1 mL nasal spray: 4 milligram(s) intranasally once , repeat as necessary.   As needed. For suspected opiate overdose   Follow instructions on packet MDD: 0.2 ml  Narcan 4 mg/0.1 mL nasal spray: 4 milligram(s) intranasally once , repeat as necessary.   As needed. For suspected opiate overdose   Follow instructions on packet MDD: 0.2 ml  oxyCODONE 5 mg oral tablet: 1 tab(s) orally every 4 hours as needed for  pain 1 tab for mild/moderate pain, 2 tabs for severe pain MDD: 6  oxyCODONE 5 mg oral tablet: 1 tab(s) orally every 4 hours as needed for  pain 1 tab for mild/moderate pain, 2 tabs for severe pain MDD: 6  pantoprazole 40 mg oral delayed release tablet: 1 tab(s) orally once a day (before a meal) MDD: 1  polyethylene glycol 3350 oral powder for reconstitution: 17 gram(s) orally once a day (at bedtime)  senna leaf extract oral tablet: 2 tab(s) orally once a day (at bedtime)  sertraline 25 mg oral tablet: 1 tab(s) orally once a day

## 2023-12-28 ENCOUNTER — TRANSCRIPTION ENCOUNTER (OUTPATIENT)
Age: 83
End: 2023-12-28

## 2023-12-28 VITALS
OXYGEN SATURATION: 96 % | RESPIRATION RATE: 18 BRPM | TEMPERATURE: 98 F | HEART RATE: 67 BPM | DIASTOLIC BLOOD PRESSURE: 66 MMHG | SYSTOLIC BLOOD PRESSURE: 106 MMHG

## 2023-12-28 LAB
ANION GAP SERPL CALC-SCNC: 7 MMOL/L — SIGNIFICANT CHANGE UP (ref 5–17)
ANION GAP SERPL CALC-SCNC: 7 MMOL/L — SIGNIFICANT CHANGE UP (ref 5–17)
BUN SERPL-MCNC: 16 MG/DL — SIGNIFICANT CHANGE UP (ref 7–23)
BUN SERPL-MCNC: 16 MG/DL — SIGNIFICANT CHANGE UP (ref 7–23)
CALCIUM SERPL-MCNC: 8.7 MG/DL — SIGNIFICANT CHANGE UP (ref 8.5–10.1)
CALCIUM SERPL-MCNC: 8.7 MG/DL — SIGNIFICANT CHANGE UP (ref 8.5–10.1)
CHLORIDE SERPL-SCNC: 105 MMOL/L — SIGNIFICANT CHANGE UP (ref 96–108)
CHLORIDE SERPL-SCNC: 105 MMOL/L — SIGNIFICANT CHANGE UP (ref 96–108)
CO2 SERPL-SCNC: 27 MMOL/L — SIGNIFICANT CHANGE UP (ref 22–31)
CO2 SERPL-SCNC: 27 MMOL/L — SIGNIFICANT CHANGE UP (ref 22–31)
CREAT SERPL-MCNC: 0.97 MG/DL — SIGNIFICANT CHANGE UP (ref 0.5–1.3)
CREAT SERPL-MCNC: 0.97 MG/DL — SIGNIFICANT CHANGE UP (ref 0.5–1.3)
EGFR: 59 ML/MIN/1.73M2 — LOW
EGFR: 59 ML/MIN/1.73M2 — LOW
GLUCOSE SERPL-MCNC: 129 MG/DL — HIGH (ref 70–99)
GLUCOSE SERPL-MCNC: 129 MG/DL — HIGH (ref 70–99)
HCT VFR BLD CALC: 34.9 % — SIGNIFICANT CHANGE UP (ref 34.5–45)
HCT VFR BLD CALC: 34.9 % — SIGNIFICANT CHANGE UP (ref 34.5–45)
HGB BLD-MCNC: 11.1 G/DL — LOW (ref 11.5–15.5)
HGB BLD-MCNC: 11.1 G/DL — LOW (ref 11.5–15.5)
MCHC RBC-ENTMCNC: 30.4 PG — SIGNIFICANT CHANGE UP (ref 27–34)
MCHC RBC-ENTMCNC: 30.4 PG — SIGNIFICANT CHANGE UP (ref 27–34)
MCHC RBC-ENTMCNC: 31.8 G/DL — LOW (ref 32–36)
MCHC RBC-ENTMCNC: 31.8 G/DL — LOW (ref 32–36)
MCV RBC AUTO: 95.6 FL — SIGNIFICANT CHANGE UP (ref 80–100)
MCV RBC AUTO: 95.6 FL — SIGNIFICANT CHANGE UP (ref 80–100)
NRBC # BLD: 0 /100 WBCS — SIGNIFICANT CHANGE UP (ref 0–0)
NRBC # BLD: 0 /100 WBCS — SIGNIFICANT CHANGE UP (ref 0–0)
PLATELET # BLD AUTO: 208 K/UL — SIGNIFICANT CHANGE UP (ref 150–400)
PLATELET # BLD AUTO: 208 K/UL — SIGNIFICANT CHANGE UP (ref 150–400)
POTASSIUM SERPL-MCNC: 4 MMOL/L — SIGNIFICANT CHANGE UP (ref 3.5–5.3)
POTASSIUM SERPL-MCNC: 4 MMOL/L — SIGNIFICANT CHANGE UP (ref 3.5–5.3)
POTASSIUM SERPL-SCNC: 4 MMOL/L — SIGNIFICANT CHANGE UP (ref 3.5–5.3)
POTASSIUM SERPL-SCNC: 4 MMOL/L — SIGNIFICANT CHANGE UP (ref 3.5–5.3)
RBC # BLD: 3.65 M/UL — LOW (ref 3.8–5.2)
RBC # BLD: 3.65 M/UL — LOW (ref 3.8–5.2)
RBC # FLD: 13 % — SIGNIFICANT CHANGE UP (ref 10.3–14.5)
RBC # FLD: 13 % — SIGNIFICANT CHANGE UP (ref 10.3–14.5)
SODIUM SERPL-SCNC: 139 MMOL/L — SIGNIFICANT CHANGE UP (ref 135–145)
SODIUM SERPL-SCNC: 139 MMOL/L — SIGNIFICANT CHANGE UP (ref 135–145)
WBC # BLD: 11.45 K/UL — HIGH (ref 3.8–10.5)
WBC # BLD: 11.45 K/UL — HIGH (ref 3.8–10.5)
WBC # FLD AUTO: 11.45 K/UL — HIGH (ref 3.8–10.5)
WBC # FLD AUTO: 11.45 K/UL — HIGH (ref 3.8–10.5)

## 2023-12-28 RX ORDER — OXYCODONE HYDROCHLORIDE 5 MG/1
1 TABLET ORAL
Qty: 42 | Refills: 0
Start: 2023-12-28 | End: 2024-01-03

## 2023-12-28 RX ORDER — ASPIRIN/CALCIUM CARB/MAGNESIUM 324 MG
1 TABLET ORAL
Qty: 60 | Refills: 0
Start: 2023-12-28 | End: 2024-01-26

## 2023-12-28 RX ORDER — DOCUSATE SODIUM 100 MG
1 CAPSULE ORAL
Qty: 14 | Refills: 0
Start: 2023-12-28 | End: 2024-01-03

## 2023-12-28 RX ORDER — NALOXONE HYDROCHLORIDE 4 MG/.1ML
4 SPRAY NASAL
Qty: 1 | Refills: 0
Start: 2023-12-28 | End: 2023-12-28

## 2023-12-28 RX ORDER — PANTOPRAZOLE SODIUM 20 MG/1
1 TABLET, DELAYED RELEASE ORAL
Qty: 30 | Refills: 0
Start: 2023-12-28 | End: 2024-01-26

## 2023-12-28 RX ORDER — POLYETHYLENE GLYCOL 3350 17 G/17G
17 POWDER, FOR SOLUTION ORAL
Qty: 0 | Refills: 0 | DISCHARGE
Start: 2023-12-28

## 2023-12-28 RX ORDER — ACETAMINOPHEN 500 MG
2 TABLET ORAL
Qty: 0 | Refills: 0 | DISCHARGE
Start: 2023-12-28

## 2023-12-28 RX ORDER — CELECOXIB 200 MG/1
1 CAPSULE ORAL
Qty: 60 | Refills: 0
Start: 2023-12-28 | End: 2024-01-26

## 2023-12-28 RX ORDER — SENNA PLUS 8.6 MG/1
2 TABLET ORAL
Qty: 0 | Refills: 0 | DISCHARGE
Start: 2023-12-28

## 2023-12-28 RX ORDER — ASCORBIC ACID 60 MG
1 TABLET,CHEWABLE ORAL
Qty: 0 | Refills: 0 | DISCHARGE
Start: 2023-12-28

## 2023-12-28 RX ADMIN — SODIUM CHLORIDE 3 MILLILITER(S): 9 INJECTION INTRAMUSCULAR; INTRAVENOUS; SUBCUTANEOUS at 06:34

## 2023-12-28 RX ADMIN — Medication 650 MILLIGRAM(S): at 06:35

## 2023-12-28 RX ADMIN — CELECOXIB 200 MILLIGRAM(S): 200 CAPSULE ORAL at 05:50

## 2023-12-28 RX ADMIN — OXYCODONE HYDROCHLORIDE 10 MILLIGRAM(S): 5 TABLET ORAL at 12:33

## 2023-12-28 RX ADMIN — Medication 650 MILLIGRAM(S): at 05:51

## 2023-12-28 RX ADMIN — OXYCODONE HYDROCHLORIDE 10 MILLIGRAM(S): 5 TABLET ORAL at 12:01

## 2023-12-28 RX ADMIN — SODIUM CHLORIDE 115 MILLILITER(S): 9 INJECTION, SOLUTION INTRAVENOUS at 05:51

## 2023-12-28 RX ADMIN — OXYCODONE HYDROCHLORIDE 5 MILLIGRAM(S): 5 TABLET ORAL at 10:00

## 2023-12-28 RX ADMIN — PANTOPRAZOLE SODIUM 40 MILLIGRAM(S): 20 TABLET, DELAYED RELEASE ORAL at 05:51

## 2023-12-28 RX ADMIN — OXYCODONE HYDROCHLORIDE 5 MILLIGRAM(S): 5 TABLET ORAL at 09:00

## 2023-12-28 RX ADMIN — Medication 25 MILLIGRAM(S): at 11:57

## 2023-12-28 RX ADMIN — Medication 1000 MILLIGRAM(S): at 01:07

## 2023-12-28 RX ADMIN — Medication 400 MILLIGRAM(S): at 00:07

## 2023-12-28 RX ADMIN — Medication 500 MILLIGRAM(S): at 05:51

## 2023-12-28 RX ADMIN — Medication 650 MILLIGRAM(S): at 12:32

## 2023-12-28 RX ADMIN — Medication 81 MILLIGRAM(S): at 05:51

## 2023-12-28 RX ADMIN — Medication 102 MILLIGRAM(S): at 05:51

## 2023-12-28 RX ADMIN — Medication 100 MILLIGRAM(S): at 01:39

## 2023-12-28 RX ADMIN — CELECOXIB 200 MILLIGRAM(S): 200 CAPSULE ORAL at 06:35

## 2023-12-28 NOTE — PROGRESS NOTE ADULT - SUBJECTIVE AND OBJECTIVE BOX
JOHANNE JOSHI is a 83y Female s/p RIGHT TOTAL HIP ARTHROPLASTY        denies any chest pain shortness of breath palpitation dizziness lightheadedness nausea vomiting fever or chills    T(C): 36.9 (12-28-23 @ 09:40), Max: 36.9 (12-28-23 @ 09:40)  HR: 75 (12-28-23 @ 09:15) (56 - 85)  BP: 125/74 (12-28-23 @ 09:15) (78/68 - 125/74)  RR: 18 (12-28-23 @ 05:40) (12 - 18)  SpO2: 96% (12-28-23 @ 09:15) (94% - 100%)  no jvd/bruit  s1 s2 rrr  cta  s/nt/nd  no calf tend                        11.1   11.45 )-----------( 208      ( 28 Dec 2023 06:20 )             34.9   12-28    139  |  105  |  16  ----------------------------<  129<H>  4.0   |  27  |  0.97    Ca    8.7      28 Dec 2023 06:20        cont dvt px  pain control  bowel regimen  antiemetics  incentive spirometer
Patient is seen and examined at bedside. Denies CP/SOB/Dizziness/N/V/D/HA. Pain is controlled.     Vital Signs Last 24 Hrs  T(C): 36.9 (28 Dec 2023 09:40), Max: 36.9 (28 Dec 2023 09:40)  T(F): 98.4 (28 Dec 2023 09:40), Max: 98.4 (28 Dec 2023 09:40)  HR: 75 (28 Dec 2023 09:15) (56 - 85)  BP: 125/74 (28 Dec 2023 09:15) (78/68 - 125/74)  BP(mean): 79 (27 Dec 2023 14:00) (73 - 96)  RR: 18 (28 Dec 2023 05:40) (12 - 18)  SpO2: 96% (28 Dec 2023 09:15) (94% - 100%)    Parameters below as of 28 Dec 2023 09:15  Patient On (Oxygen Delivery Method): room air        GEN: NAD  ABD: soft, NT/ND; no rebound or guarding;  Neurologic: AAOx3; CNS grossly intact; no focal deficits  RLE: CYNDEE dressing C/D/I. Battery flashing green/ok.  Motor intact + EHL/FHL/TA/GS. Sensation is grossly intact.  Extremities warm. . Compartments soft, compressible. No calf tenderness. DP 2+.  LLE:  Motor intact + EHL/FHL/TA/GS. Sensation is grossly intact. Extremities warm. Compartments soft, compressible. No calf tenderness.. DP 2+.    Labs:                          11.1   11.45 )-----------( 208      ( 28 Dec 2023 06:20 )             34.9       12-28    139  |  105  |  16  ----------------------------<  129<H>  4.0   |  27  |  0.97    Ca    8.7      28 Dec 2023 06:20        A/P: Patient is a 83y y/o Female s/p right ORQUIDEA, POD # 1  -wound care, isometric exercises, GI motility, new medications, hip precautions,  hospital course and discharge planning reviewed with pt  -Pain control/analgesia reviewed   -Inc spirometry reviewed and counseled  -Venodynes/foot pumps  -PT/OT/WBAT  -Anticoagulation: asa 81mg BID  -Medical consult reviewed   -DC plan for home today with home care  
Patient is seen and examined at bedside. Denies CP/SOB/Dizziness/N/V/D/HA. Pain is controlled.     Vital Signs Last 24 Hrs  T(C): 36.4 (27 Dec 2023 14:40), Max: 36.4 (27 Dec 2023 07:42)  T(F): 97.5 (27 Dec 2023 14:40), Max: 97.6 (27 Dec 2023 07:42)  HR: 61 (27 Dec 2023 14:40) (56 - 79)  BP: 99/73 (27 Dec 2023 14:40) (78/68 - 133/79)  BP(mean): 79 (27 Dec 2023 14:00) (73 - 96)  RR: 15 (27 Dec 2023 14:40) (12 - 18)  SpO2: 100% (27 Dec 2023 14:40) (96% - 100%)    Parameters below as of 27 Dec 2023 14:40  Patient On (Oxygen Delivery Method): room air        GEN: NAD  ABD: soft, NT/ND; no rebound or guarding;  Neurologic: AAOx3; CNS grossly intact; no focal deficits  RLE: CYNDEE dressing C/D/I. Battery flashing green/ok.  Motor intact + EHL/FHL/TA/GS. Sensation is grossly intact.  Extremities warm. . Compartments soft, compressible. No calf tenderness. DP 2+.  LLE:  Motor intact + EHL/FHL/TA/GS. Sensation is grossly intact. Extremities warm. Compartments soft, compressible. No calf tenderness.. DP 2+.    Labs:        12-27    142  |  113<H>  |  14  ----------------------------<  122<H>  4.4   |  25  |  0.68    Ca    9.1      27 Dec 2023 13:00        A/P: Patient is a 83y y/o Female s/p right ORQUIDEA, POD # 0  -wound care, isometric exercises, GI motility, new medications, hip precautions,  hospital course and discharge planning reviewed with pt  -Pain control/analgesia reviewed   -Inc spirometry reviewed and counseled  -Venodynes/foot pumps  -F/U AM Labs  -PT/OT/WBAT  -Antibiotic per SCIPS  -Anticoagulation: asa 81mg BID  -Medical consult pending   -DC home tomorrow

## 2023-12-28 NOTE — DISCHARGE NOTE NURSING/CASE MANAGEMENT/SOCIAL WORK - NSDCPEFALRISK_GEN_ALL_CORE
For information on Fall & Injury Prevention, visit: [email or web address] OR  [email or web address] OR  https://www.cdc.gov/steadi/patient.html For information on Fall & Injury Prevention, visit: [email or web address] OR  [email or web address] OR  https://www.cdc.gov/steadi/patient.html

## 2023-12-28 NOTE — DISCHARGE NOTE NURSING/CASE MANAGEMENT/SOCIAL WORK - PATIENT PORTAL LINK FT
You can access the FollowMyHealth Patient Portal offered by [facility] by registering at the following website: [email or web address]. By joining [email or web address]’s FollowMyHealth portal, you will also be able to view your health information using other applications (apps) compatible with our system. You can access the FollowMyHealth Patient Portal offered by [facility] by registering at the following website: [email or web address]. By joining [employer]’s FollowMyHealth portal, you will also be able to view your health information using other applications (apps) compatible with our system.

## 2023-12-28 NOTE — DISCHARGE NOTE NURSING/CASE MANAGEMENT/SOCIAL WORK - NSDCFUADDAPPT_GEN_ALL_CORE_FT
Follow up with your surgeon in two weeks. Call for appointment.    If you need more pain medications, call your surgeon's office. For medication refills or authorizations call [phone] xt [phone]    Make sure to have a bowel movement by 2 days after surgery. Take stool softners and laxatives as needed.    Call and schedule a follow up appointment with your primary care physician for repeat blood work (CBC and BMP) for post hospital discharge follow-up care.    Call your surgeon if you have increased redness/pain/drainage or fever. Return to ER for shortness of breath/calf tenderness. Follow up with your surgeon in two weeks. Call for appointment.    If you need more pain medications, call your surgeon's office. For medication refills or authorizations call [phone] xt [phone]    Make sure to have a bowel movement by 2 days after surgery. Take stool softners and laxatives as needed.    Call and schedule a follow up appointment with your primary care physician for repeat blood work (CBC and BMP) for post hospital discharge follow-up care.    Call your surgeon if you have increased redness/pain/drainage or fever. Return to ER for shortness of breath/calf tenderness.

## 2024-01-03 DIAGNOSIS — Z83.3 FAMILY HISTORY OF DIABETES MELLITUS: ICD-10-CM

## 2024-01-03 DIAGNOSIS — M06.9 RHEUMATOID ARTHRITIS, UNSPECIFIED: ICD-10-CM

## 2024-01-03 DIAGNOSIS — Z88.0 ALLERGY STATUS TO PENICILLIN: ICD-10-CM

## 2024-01-03 DIAGNOSIS — K21.9 GASTRO-ESOPHAGEAL REFLUX DISEASE WITHOUT ESOPHAGITIS: ICD-10-CM

## 2024-01-03 DIAGNOSIS — Z79.82 LONG TERM (CURRENT) USE OF ASPIRIN: ICD-10-CM

## 2024-01-03 DIAGNOSIS — Z80.1 FAMILY HISTORY OF MALIGNANT NEOPLASM OF TRACHEA, BRONCHUS AND LUNG: ICD-10-CM

## 2024-01-03 DIAGNOSIS — M16.11 UNILATERAL PRIMARY OSTEOARTHRITIS, RIGHT HIP: ICD-10-CM

## 2024-01-03 DIAGNOSIS — Z88.1 ALLERGY STATUS TO OTHER ANTIBIOTIC AGENTS STATUS: ICD-10-CM

## 2024-01-03 DIAGNOSIS — F41.9 ANXIETY DISORDER, UNSPECIFIED: ICD-10-CM

## 2024-01-05 ENCOUNTER — NON-APPOINTMENT (OUTPATIENT)
Age: 84
End: 2024-01-05

## 2024-01-09 ENCOUNTER — APPOINTMENT (OUTPATIENT)
Dept: ORTHOPEDIC SURGERY | Facility: CLINIC | Age: 84
End: 2024-01-09
Payer: MEDICARE

## 2024-01-09 VITALS — BODY MASS INDEX: 24.43 KG/M2 | HEIGHT: 66 IN | WEIGHT: 152 LBS

## 2024-01-09 PROBLEM — K21.9 GASTRO-ESOPHAGEAL REFLUX DISEASE WITHOUT ESOPHAGITIS: Chronic | Status: ACTIVE | Noted: 2023-12-07

## 2024-01-09 PROCEDURE — 99024 POSTOP FOLLOW-UP VISIT: CPT

## 2024-01-09 PROCEDURE — 73503 X-RAY EXAM HIP UNI 4/> VIEWS: CPT | Mod: RT

## 2024-01-25 ENCOUNTER — APPOINTMENT (OUTPATIENT)
Dept: ORTHOPEDIC SURGERY | Facility: CLINIC | Age: 84
End: 2024-01-25
Payer: MEDICARE

## 2024-01-25 VITALS — BODY MASS INDEX: 24.43 KG/M2 | HEIGHT: 66 IN | WEIGHT: 152 LBS

## 2024-01-25 PROCEDURE — 99024 POSTOP FOLLOW-UP VISIT: CPT

## 2024-01-25 RX ORDER — CEPHALEXIN 500 MG/1
500 CAPSULE ORAL
Qty: 40 | Refills: 0 | Status: ACTIVE | COMMUNITY
Start: 2024-01-25 | End: 1900-01-01

## 2024-01-26 ENCOUNTER — APPOINTMENT (OUTPATIENT)
Dept: ORTHOPEDIC SURGERY | Facility: CLINIC | Age: 84
End: 2024-01-26

## 2024-01-30 ENCOUNTER — APPOINTMENT (OUTPATIENT)
Dept: ORTHOPEDIC SURGERY | Facility: CLINIC | Age: 84
End: 2024-01-30
Payer: MEDICARE

## 2024-01-30 DIAGNOSIS — T81.41XA INFECTION FOLLOWING A PROCEDURE,SUPERFICIAL INCISIONAL SURGI SITE,INITIAL ENC: ICD-10-CM

## 2024-01-30 PROCEDURE — 99024 POSTOP FOLLOW-UP VISIT: CPT

## 2024-01-30 RX ORDER — BECAPLERMIN 100 UG/G
0.01 GEL TOPICAL
Qty: 1 | Refills: 0 | Status: ACTIVE | COMMUNITY
Start: 2024-01-30 | End: 1900-01-01

## 2024-01-30 NOTE — PHYSICAL EXAM
[Right] : right hip [] : no tenderness [FreeTextEntry3] : FIBRINOUS EXUDATE [FreeTextEntry9] : WAS NOT ASSESSED.  [de-identified] : WAS NOT ASSESSED.

## 2024-01-30 NOTE — DATA REVIEWED
[FreeTextEntry1] : RIGHT HIP MRI ([facility]) 11/8/23: 1. Subchondral fracture involving the superior aspect of the right femoral head with associated bone marrow edema and flattening of the femoral head. 2. Nondisplaced tear involving the anterolateral aspect of the right acetabular labrum. 3. Mild right gluteus medius tendinosis with partial tearing at its insertion.

## 2024-01-30 NOTE — HISTORY OF PRESENT ILLNESS
[7] : 7 [Throbbing] : throbbing [Constant] : constant [de-identified] : 1/9/2024: Here for first post op visit s/p Right ORQUIDEA. Doing well.  [] : no [FreeTextEntry1] : RT hip [FreeTextEntry5] : No reported injury, pain started a few months ago. Pain travels from the groin down the front of the leg. Pain has gotten worse over the past few months. [de-identified] : Dr. Cook

## 2024-01-30 NOTE — ASSESSMENT
[FreeTextEntry1] : S/P RT ORQUIDEA 12/27/23: DOING WELL. XRAYS REVIEWED WITH COMPONENTS WELL FIXED. NO SIGNS OF INFECTION. PROPER ELEVATION TECHNIQUES DISCUSSED. ABX AND PRECAUTIONS REVIEWED. PT RX. QUESTIONS ANSWERED.   SMALL FIBRINOUS EXUDATE NEAR SUTURE SITE. NO F/C/S. NO ERYTHEMA, INDURATION, DRAINAGE. PROBED AND SUPERFICIAL. TEGADERM APPLIED. WILL FOLLOW UP IN 1 WEEK. REGRANEX RX.

## 2024-02-01 ENCOUNTER — APPOINTMENT (OUTPATIENT)
Dept: PAIN MANAGEMENT | Facility: CLINIC | Age: 84
End: 2024-02-01

## 2024-02-06 ENCOUNTER — APPOINTMENT (OUTPATIENT)
Dept: ORTHOPEDIC SURGERY | Facility: CLINIC | Age: 84
End: 2024-02-06
Payer: MEDICARE

## 2024-02-06 VITALS — HEIGHT: 66 IN | BODY MASS INDEX: 24.43 KG/M2 | WEIGHT: 152 LBS

## 2024-02-06 PROCEDURE — 99024 POSTOP FOLLOW-UP VISIT: CPT

## 2024-02-06 NOTE — HISTORY OF PRESENT ILLNESS
[0] : 0 [] : Post Surgical Visit: yes [FreeTextEntry5] : here for post op/ R hip feels great. [de-identified] : DARRYL HEARD

## 2024-02-06 NOTE — PHYSICAL EXAM
[Right] : right hip [] : ambulation with cane [FreeTextEntry3] : FIBRINOUS EXUDATE [FreeTextEntry9] : WAS NOT ASSESSED.  [de-identified] : WAS NOT ASSESSED.

## 2024-02-06 NOTE — ASSESSMENT
[FreeTextEntry1] : S/P RT ORQUIDEA 12/27/23: DOING WELL. XRAYS REVIEWED WITH COMPONENTS WELL FIXED. NO SIGNS OF INFECTION. PROPER ELEVATION TECHNIQUES DISCUSSED. ABX AND PRECAUTIONS REVIEWED. PT RX. QUESTIONS ANSWERED.   SMALL FIBRINOUS EXUDATE WITH ROUTINE HEALING NEAR SUTURE SITE. NO F/C/S. NO ERYTHEMA, INDURATION, DRAINAGE. PROBED AND SUPERFICIAL. TEGADERM REAPPLIED. WILL FOLLOW UP IN APRIL AS SCHEDULED.

## 2024-02-19 ENCOUNTER — APPOINTMENT (OUTPATIENT)
Dept: PAIN MANAGEMENT | Facility: CLINIC | Age: 84
End: 2024-02-19
Payer: MEDICARE

## 2024-02-19 VITALS — BODY MASS INDEX: 24.91 KG/M2 | HEIGHT: 66 IN | WEIGHT: 155 LBS

## 2024-02-19 DIAGNOSIS — M54.41 LUMBAGO WITH SCIATICA, RIGHT SIDE: ICD-10-CM

## 2024-02-19 DIAGNOSIS — M25.552 PAIN IN LEFT HIP: ICD-10-CM

## 2024-02-19 DIAGNOSIS — M25.559 PAIN IN UNSPECIFIED HIP: ICD-10-CM

## 2024-02-19 DIAGNOSIS — M47.816 SPONDYLOSIS W/OUT MYELOPATHY OR RADICULOPATHY, LUMBAR REGION: ICD-10-CM

## 2024-02-19 DIAGNOSIS — M48.061 SPINAL STENOSIS, LUMBAR REGION WITHOUT NEUROGENIC CLAUDICATION: ICD-10-CM

## 2024-02-19 DIAGNOSIS — M25.569 PAIN IN UNSPECIFIED KNEE: ICD-10-CM

## 2024-02-19 PROCEDURE — 20553 NJX 1/MLT TRIGGER POINTS 3/>: CPT

## 2024-02-19 PROCEDURE — 99214 OFFICE O/P EST MOD 30 MIN: CPT | Mod: 25

## 2024-02-19 PROCEDURE — J3490M: CUSTOM

## 2024-02-19 NOTE — PHYSICAL EXAM
[de-identified] : Constitutional:  - No acute distress  - Well developed; well nourished   Neurological:  - normal mood and affect  - alert and oriented x 3    Cardiovascular:  - grossly normal  Lumbar Spine Exam:   Inspection:  erythema (-)  ecchymosis (-)  rashes (-)  alignment: no scoliosis  Palpation:   paraspinal tenderness:                  L (+) ; R (+)  thoracic paraspinal tenderness:    L (-) ; R (-)  sciatic nerve tenderness :             L (-) ; R (-)  SI joint tenderness:                        L (-) ; R (-)  GTB tenderness:                            L (-);  R (-)   ROM:   diminished ROM all planes; moderate stiffness with extremes of extension Pain with extremes of extension   Strength:                   Right       Left     Hip Flexion:                (5-/5)       (5-/5)  Quadriceps:               (5-/5)       (5-/5)  Hamstrings:                (5/5)       (5/5)  Ankle Dorsiflexion:     (5/5)       (5/5)  EHL:                            (5/5)       (5/5)  Ankle Plantarflexion:  (5/5)       (5/5)   Special Tests:  SLR:                      R (-) ; L (-)  Facet loading:       R (+) ; L (+)  GERMAINE test:          R (n/a) ; L (n/a) Tight Hamstrings   R (+) ; L (+)   Neurologic:  Light touch intact throughout LE bilaterally  Reflexes normal and symmetric bilaterally  Gait:  mildly antalgic

## 2024-02-19 NOTE — HISTORY OF PRESENT ILLNESS
[Neck] : neck [Lower back] : lower back [Gradual] : gradual [7] : 7 [Burning] : burning [Shooting] : shooting [Constant] : constant [Household chores] : household chores [Leisure] : leisure [Rest] : rest [Standing] : standing [Walking] : walking [FreeTextEntry1] : 2024 - Patient presents for FUV after a L4-5 LESI on 2023.  She no longer has pain buttock/groin, improved s/p R THR with Dr. Jolly (DOS 23).  She is experiencing a painful exacerbation of LBP, feels like her spine is "out of whack". Radiates up and down her spine, R>L, "shock-like" in nature. Starts up pain after sitting. Worse in the am. No loss or b/b control.  No meds for pain. Aggravated by using cane for 10 days post op.  10/17/23 - Patient presents for FUV regarding their lower back pain. She was referred by Dr. Cook for repeat LESI.  The patient reports over the past 2-3 months she has noticed and increased in right buttock pain radiating to the right groin and anterior thigh.  The patient was also referred to hip specialist by Dr. Cook for further evaluation of her groin pain.    Regarding her neck and shoulder, she complains of focal right shoulder pain with decreased ROM.  She had a ABIOLA in May with good resolution of her radiating neck pain but the shoulder ROM has been declining.  23 - Patient presents for FUV after a bilateral L4-5, L5-S1 facet block on 2023. Patient reports greater than 80% reduction in her lower back pain for 6-8 hours the day of the facet block before symptoms returned to baseline.  She does report a return of her right buttock and right thigh pain as well.  She has responded well to lumbar ROSALEE for resolution of the symptoms.  23- Patient presents for FUV after a C7-T1 ABIOLA on 2023.  Patient reports 80% relief following the injection.  Pleased with response.  She wants to address her lower back pain.  She complains predominantly axial lower back worse with getting up from sitting.  Occasional radiation to her right butt.    23 - Patient presents for FUV after a Lumbar and Cervical MRI on 2023.  Complains predominately of neck pain with radiation to the right shoulder and upper extremity.  Again she feels that this is a newer pain from her chronic longstanding issues.  Uses medication therapy sparingly.  23 - Patient presents to the office for reevaluation; last seen in 2022.  Patient complains of neck, right shoulder pain (new symptom), and lower back pain. Patient states that her pain got worse since the holidays so she scheduled a visit for possible interventional therapy. Patient has not seen other orthopedic specialist for neck or back or right shoulder. She states that one symptom isn't worse than the other they all kind of blend with each other.  Patient takes meloxicam sparingly. She is not in physical therapy at this time.  In regard to her neck she complains of decreased ROM and LUE paraesthesias when she sleeps.  Patient has right shoulder pain with ROM movements.  In regard to her back she complains of axial back pain along the belt line with mild radiation to b/l buttocks.    22 - Patient presents for a FUV following an L4-5 LESI on 22. Patient reports experiencing 3 day pain flare-up which has resolved. She reports benefit following the injection. Pain is at an acceptable level.   22 - Patient presents for a FUV. Last seen 6 months ago.  Patient c/o low back pain radiating to the bilateral buttocks and to the RLE. She reports benefit with ROSALEE's in the past. Patient reports no observable benefit with knee gel injections x 4 with Dr. Glasgow. She reports some weakness and paraesthesia to the RLE.   21 - Patient presents for a FUV following a Left Paramedian L4-5 LESI on 11/10/21. Patient reports her radicular left leg pain was completely resolved following her last LESI. She is pleased with the results of her injection.   21 - Patient presents for a FUV. Patient reports that she has had an increase of lower back pain radiating into her left hip which began two weeks ago. Patient reports that prior to two weeks ago she didn't experience this pain since her previous ROSALEE. Patient reports that this acute flare up feels worse than her previous episodes. Patient has been taking meloxicam without significant relief.  21 - FUV after L4-L5 LESI on 21. She reports 75% improvement of her low back and right radicular leg pain. She has increased her activity level with reduction of pain. She is pleased with response to ROSALEE. No new complaints.   10/22/2020 - FUV for imaging review regarding her low back pain. Feels her low back pain has improved since initial fall until this morning. Feels increased stiffness in the morning which improves as the day continues. MRI reviewed in detail.   10/8/2020 - Patient complains of new injury. She reports fall 1 week ago down her stairs on her buttocks. No pain initially but for several days following developed increasing pain in low back into right buttock and posterior thigh. Was put on MDP for different issue which improved some of her back and leg pain but is fearful pain will return now that she completed oral steroids. No change in pain when sitting/standing/laying down.   Injections:  1) C7-T1 ABIOLA (19, 2023) 2) Right C3-C4, C4-C5, C5-C6 facet joint RFA (20) 3) L4-5 LESI (22, 23)  4) Bilateral L4-5, L5-S1 facet block (2023)  Pertinent Surgical History: N/A  Imagin) MRI Lumbar Spine (2023) - [facility] Findings: There is exaggerated lumbar lordosis with grade 1 anterolisthesis at L4-L5 and L5-S1. There is slight retrolisthesis at the thoracolumbar junction. The conus appears unremarkable. There is no acute vertebral body fracture. There are no gross paravertebral soft tissue masses. There are findings suggesting multiple cysts within bilateral kidneys right greater than left measuring up to 5-6 cm in the inferior right kidney incompletely evaluated on the current exam. There is no gross paravertebral soft tissue mass. L1-L2: There is disc bulging, bony ridging, facet arthrosis, and moderate bilateral foraminal narrowing. L2-L3: There is diffuse disc bulging, bony ridging, facet arthrosis, and moderate bilateral foraminal narrowing. L3-L4: There is disc bulging, bony ridging, facet arthrosis, broad-based posterior disc herniation, mild central stenosis, and left greater than right exiting L3 nerve root impingement. L4-L5: There is anterolisthesis, disc bulging, bony ridging, facet arthrosis, moderate central stenosis, and bilateral exiting L4 nerve root impingement. L5-S1: There is anterolisthesis, disc bulging, bony ridging, facet arthrosis, mild central stenosis, and bilateral exiting L5 nerve root impingement.  2) MRI Cervical Spine (2023) - [facility] Findings: There is straightening of the cervical lordosis and multilevel disc desiccation with loss of disc height and mild degenerative endplate changes in the mid-to-lower cervical spine without acute vertebral body fracture. There is multilevel uncovertebral hypertrophy. The visualized posterior fossa structures appear unremarkable. There are no gross paravertebral soft tissue masses. C2-C3: There is disc bulging, bony ridging, uncovertebral hypertrophy, and left greater than right neural foraminal narrowing. C3-C4: There is disc bulging, bony ridging, uncovertebral hypertrophy, and left greater than right neural foraminal narrowing. C4-C5: There is disc bulging, bony ridging, uncovertebral hypertrophy, mild central stenosis, and impingement upon bilateral exiting C5 nerve roots right greater than left. C5-C6: There is disc bulging, bony ridging, uncovertebral hypertrophy, cord impingement, and right greater than left exiting nerve root impingement. C6-C7: There is disc bulging, bony ridging, broad-based posterior disc herniation, cord impingement, and bilateral exiting nerve root impingement.  Physician Disclaimer: I have personally reviewed and confirmed all HPI data with the patient. [] : no

## 2024-02-19 NOTE — PROCEDURE
[Trigger point 1-2 muscle groups] : trigger point 1-2 muscle groups [Lumbar paraspinal muscle] : lumbar paraspinal muscle [Ethyl Chloride sprayed topically] : ethyl chloride sprayed topically [Sterile technique used] : sterile technique used [___ cc    0.25%] : Bupivacaine (Marcaine) ~Vcc of 0.25%  [] : Patient tolerated procedure well [Call if redness, pain or fever occur] : call if redness, pain or fever occur [Apply ice for 15min out of every hour for the next 12-24 hours as tolerated] : apply ice for 15 minutes out of every hour for the next 12-24 hours as tolerated [Right] : of the right [Risks, benefits, alternatives discussed / Verbal consent obtained] : the risks benefits, and alternatives have been discussed, and verbal consent was obtained

## 2024-02-19 NOTE — ASSESSMENT
[FreeTextEntry1] : A thorough discussion occurred regarding available pain management treatment options including interventional, rehabilitative, pharmacological, and alternative modalities with the patient. We will proceed with the following:  Interventional treatment options: - Proceed with bilateral repeat bilateral L4-L5, L5-S1 facet joint MBB for ongoing axial low back pain - TPI performed today for suspected lumbar myofascial pain component (no corticosteroid) - consider repeat right PM C7-T1 ABIOLA with return of severe radicular pain - see additional instructions below  Rehabilitative options: - Continue physical therapy as per orthopedics - add modalities for lumbar spine to PT script - Encouraged active participation in HEP as tolerated - Previously provided home exercise sheet  Medication based treatment options: - continue Tylenol 500-1000 mg TID PRN mild-moderate pain - continue Meloxicam 15 mg daily PRN moderate-severe pain, renewed today - Patient uses medication therapy sparingly - see additional instructions below  Complementary treatment options: - Weight management and lifestyle modifications discussed - patient will consider acupuncture in the future if non-sustained relief  Additional treatment recommendations as follows: - F/U w/ Dr. Jolly as scheduled, healing from suture issue, which is healing. - Follow up 1-2 weeks post injection for assessment of efficacy and further treatment recommendations  The risks, benefits and alternatives of the proposed procedure were explained in detail with the patient. The risks outlined include, but are not limited to, infection, bleeding, nerve injury, post dural headache, a temporary increase in pain, failure to resolve symptoms, allergic reaction, and possible elevation of blood sugar in diabetics if using corticosteroid. All questions were answered to patient's apparent satisfaction and he/she verbalized an understanding.  Patient presents with axial lumbar pain that has not responded to 3 months of conservative therapy including physical therapy or NSAID therapy.  The pain is interfering with activities of daily living and functionality.  There is no radicular pain.  The pain is exacerbated by facet loading.  Positive Kemps maneuver which is defined by pain reproduction with extension and rotation of the lumbar spine to the affected side.  The patient has not had a vertebral fusion at the levels of the proposed treatment.  There is no unexplained neurologic deficit.  There is no history of systemic infection, unstable medical condition, bleeding tendency, or local infection.  The injection is being performed to diagnose the facet joint as the source of the individual's pain.  We have discussed the risks, benefits, and alternatives NSAID therapy including but not limited to the risk of bleeding, thrombosis, gastric mucosal irritation/ulceration, allergic reaction and kidney dysfunction; the patient verbalizes an understanding.  The documentation recorded by the scribe, in my presence, accurately reflects the service I personally performed and the decisions made by me with my edits as appropriate.  I, Jaleel Cui acting as scribe, attest that this documentation has been prepared under the direction and in the presence of Provider Andreas Albright DO.

## 2024-03-06 ENCOUNTER — APPOINTMENT (OUTPATIENT)
Dept: PAIN MANAGEMENT | Facility: CLINIC | Age: 84
End: 2024-03-06
Payer: MEDICARE

## 2024-03-06 PROCEDURE — 64494 INJ PARAVERT F JNT L/S 2 LEV: CPT | Mod: RT,59

## 2024-03-06 PROCEDURE — 64493 INJ PARAVERT F JNT L/S 1 LEV: CPT | Mod: RT,59

## 2024-03-06 PROCEDURE — J3490M: CUSTOM

## 2024-03-06 NOTE — PROCEDURE
[FreeTextEntry3] : Date of Service: 03/06/2024   Account: [id number]  Patient: JOHANNE JOSHI   YOB: 1940  Age: 83 year  Surgeon:                  Andreas Albright DO  Assistant:                None  Pre-Operative Diagnosis:   Lumbar Spondylosis      Post Operative Diagnosis:  Lumbar Spondylosis  Procedure:             Bilateral L4-5, L5-S1 facet block under fluoroscopic guidance.  Anesthesia:            MAC  This procedure was carried out using fluoroscopic guidance.  The risks and benefits of the procedure were discussed extensively with the patient.  The consent of the patient was obtained and the following procedure was performed. The patient was placed in the prone position on the fluoroscopy table and the area was prepped and draped in a sterile fashion.  A timeout was performed with all essential staff present and the site and side were verified.  The lumbar vertebral bodies were identified and the fluoroscope was obliqued ipsilateral to approximately 30 degrees to reveal the appropriate anatomical view.  The junction of the superior articulate process and transverse process at the right L4 and L5 levels were identified and marked.   The skin at these target points was then localized using 1 cc of 1% Lidocaine at each injection site.  A spinal needle was then introduced and advanced to the above target points at the junction of the SAP and transverse processes until bone was contacted.  Fluoroscope then focused on the right sacral ala on A/P view and marked at this point.  The skin and subcutaneous structures were localized using 1cc of 1.0 % lidocaine.  A spinal needle was then advanced under fluoroscopic guidance until bone was contacted at the ala.    After negative aspiration for heme and CSF1 cc of 0.5% Marcaine was injected at each of the injection sites.  The procedure was performed in the exact same fashion on the contralateral left side at the L4, L5 and sacral ala levels.  Vital signs remained normal throughout the procedure.  The patient tolerated the procedure well.  There were no immediate complications from the performed procedure.  The patient was instructed to apply ice over the injection sites for twenty minutes every two hours for the next 24 hours.  Disposition:      1. The patient was advised to F/U in 1-2 weeks to assess the response to the injection.       2. They were advised to keep a pain diary to report the results of the diagnostic block at their FUV.      3. The patient was also instructed to contact me immediately if there were any concerns related to the procedure performed.

## 2024-03-12 ENCOUNTER — APPOINTMENT (OUTPATIENT)
Dept: PAIN MANAGEMENT | Facility: CLINIC | Age: 84
End: 2024-03-12
Payer: MEDICARE

## 2024-03-12 VITALS — WEIGHT: 155 LBS | HEIGHT: 66 IN | BODY MASS INDEX: 24.91 KG/M2

## 2024-03-12 PROCEDURE — 99214 OFFICE O/P EST MOD 30 MIN: CPT

## 2024-03-12 NOTE — PROCEDURE
[Trigger point 1-2 muscle groups] : trigger point 1-2 muscle groups [Right] : of the right [Lumbar paraspinal muscle] : lumbar paraspinal muscle [Ethyl Chloride sprayed topically] : ethyl chloride sprayed topically [Sterile technique used] : sterile technique used [___ cc    0.25%] : Bupivacaine (Marcaine) ~Vcc of 0.25%  [] : Patient tolerated procedure well [Call if redness, pain or fever occur] : call if redness, pain or fever occur [Apply ice for 15min out of every hour for the next 12-24 hours as tolerated] : apply ice for 15 minutes out of every hour for the next 12-24 hours as tolerated [Risks, benefits, alternatives discussed / Verbal consent obtained] : the risks benefits, and alternatives have been discussed, and verbal consent was obtained

## 2024-03-13 NOTE — HISTORY OF PRESENT ILLNESS
[Neck] : neck [Lower back] : lower back [Gradual] : gradual [7] : 7 [Burning] : burning [Shooting] : shooting [Constant] : constant [Household chores] : household chores [Leisure] : leisure [Standing] : standing [Rest] : rest [Walking] : walking [FreeTextEntry1] : 3/12/2024 - Patient presents for FUV after a bilateral L4-5, L5-S1 facet block on 3/6/2024. Patient had no meaningful relief the day of the procedure. Her pain is across the lower back (L>R) with radiation bilaterally to the buttocks, left hip, and occasional jolts of shooting pain down the left lower extremity.   2024 - Patient presents for FUV after a L4-5 LESI on 2023.  She no longer has pain buttock/groin, improved s/p R THR with Dr. Jolly (DOS 23).  She is experiencing a painful exacerbation of LBP, feels like her spine is "out of whack". Radiates up and down her spine, R>L, "shock-like" in nature. Starts up pain after sitting. Worse in the am. No loss or b/b control.  No meds for pain. Aggravated by using cane for 10 days post op.  10/17/23 - Patient presents for FUV regarding their lower back pain. She was referred by Dr. Cook for repeat LESI.  The patient reports over the past 2-3 months she has noticed and increased in right buttock pain radiating to the right groin and anterior thigh.  The patient was also referred to hip specialist by Dr. Cook for further evaluation of her groin pain.    Regarding her neck and shoulder, she complains of focal right shoulder pain with decreased ROM.  She had a ABIOLA in May with good resolution of her radiating neck pain but the shoulder ROM has been declining.  23 - Patient presents for FUV after a bilateral L4-5, L5-S1 facet block on 2023. Patient reports greater than 80% reduction in her lower back pain for 6-8 hours the day of the facet block before symptoms returned to baseline.  She does report a return of her right buttock and right thigh pain as well.  She has responded well to lumbar ROSALEE for resolution of the symptoms.  23- Patient presents for FUV after a C7-T1 ABIOLA on 2023.  Patient reports 80% relief following the injection.  Pleased with response.  She wants to address her lower back pain.  She complains predominantly axial lower back worse with getting up from sitting.  Occasional radiation to her right butt.    23 - Patient presents for FUV after a Lumbar and Cervical MRI on 2023.  Complains predominately of neck pain with radiation to the right shoulder and upper extremity.  Again she feels that this is a newer pain from her chronic longstanding issues.  Uses medication therapy sparingly.  23 - Patient presents to the office for reevaluation; last seen in 2022.  Patient complains of neck, right shoulder pain (new symptom), and lower back pain. Patient states that her pain got worse since the holidays so she scheduled a visit for possible interventional therapy. Patient has not seen other orthopedic specialist for neck or back or right shoulder. She states that one symptom isn't worse than the other they all kind of blend with each other.  Patient takes meloxicam sparingly. She is not in physical therapy at this time.  In regard to her neck she complains of decreased ROM and LUE paraesthesias when she sleeps.  Patient has right shoulder pain with ROM movements.  In regard to her back she complains of axial back pain along the belt line with mild radiation to b/l buttocks.    22 - Patient presents for a FUV following an L4-5 LESI on 22. Patient reports experiencing 3 day pain flare-up which has resolved. She reports benefit following the injection. Pain is at an acceptable level.   22 - Patient presents for a FUV. Last seen 6 months ago.  Patient c/o low back pain radiating to the bilateral buttocks and to the RLE. She reports benefit with ROSALEE's in the past. Patient reports no observable benefit with knee gel injections x 4 with Dr. Glasgow. She reports some weakness and paraesthesia to the RLE.   21 - Patient presents for a FUV following a Left Paramedian L4-5 LESI on 11/10/21. Patient reports her radicular left leg pain was completely resolved following her last LESI. She is pleased with the results of her injection.   21 - Patient presents for a FUV. Patient reports that she has had an increase of lower back pain radiating into her left hip which began two weeks ago. Patient reports that prior to two weeks ago she didn't experience this pain since her previous ROSALEE. Patient reports that this acute flare up feels worse than her previous episodes. Patient has been taking meloxicam without significant relief.  21 - FUV after L4-L5 LESI on 21. She reports 75% improvement of her low back and right radicular leg pain. She has increased her activity level with reduction of pain. She is pleased with response to ROSALEE. No new complaints.   10/22/2020 - FUV for imaging review regarding her low back pain. Feels her low back pain has improved since initial fall until this morning. Feels increased stiffness in the morning which improves as the day continues. MRI reviewed in detail.   10/8/2020 - Patient complains of new injury. She reports fall 1 week ago down her stairs on her buttocks. No pain initially but for several days following developed increasing pain in low back into right buttock and posterior thigh. Was put on MDP for different issue which improved some of her back and leg pain but is fearful pain will return now that she completed oral steroids. No change in pain when sitting/standing/laying down.   Injections:  1) C7-T1 ABIOLA (19, 2023) 2) Right C3-C4, C4-C5, C5-C6 facet joint RFA (20) 3) L4-5 LESI (22, 23)  4) Bilateral L4-5, L5-S1 facet block (2023, 3/6/2024)  Pertinent Surgical History: N/A  Imagin) MRI Lumbar Spine (2023) - [facility] Findings: There is exaggerated lumbar lordosis with grade 1 anterolisthesis at L4-L5 and L5-S1. There is slight retrolisthesis at the thoracolumbar junction. The conus appears unremarkable. There is no acute vertebral body fracture. There are no gross paravertebral soft tissue masses. There are findings suggesting multiple cysts within bilateral kidneys right greater than left measuring up to 5-6 cm in the inferior right kidney incompletely evaluated on the current exam. There is no gross paravertebral soft tissue mass. L1-L2: There is disc bulging, bony ridging, facet arthrosis, and moderate bilateral foraminal narrowing. L2-L3: There is diffuse disc bulging, bony ridging, facet arthrosis, and moderate bilateral foraminal narrowing. L3-L4: There is disc bulging, bony ridging, facet arthrosis, broad-based posterior disc herniation, mild central stenosis, and left greater than right exiting L3 nerve root impingement. L4-L5: There is anterolisthesis, disc bulging, bony ridging, facet arthrosis, moderate central stenosis, and bilateral exiting L4 nerve root impingement. L5-S1: There is anterolisthesis, disc bulging, bony ridging, facet arthrosis, mild central stenosis, and bilateral exiting L5 nerve root impingement.  2) MRI Cervical Spine (2023) - [facility] Findings: There is straightening of the cervical lordosis and multilevel disc desiccation with loss of disc height and mild degenerative endplate changes in the mid-to-lower cervical spine without acute vertebral body fracture. There is multilevel uncovertebral hypertrophy. The visualized posterior fossa structures appear unremarkable. There are no gross paravertebral soft tissue masses. C2-C3: There is disc bulging, bony ridging, uncovertebral hypertrophy, and left greater than right neural foraminal narrowing. C3-C4: There is disc bulging, bony ridging, uncovertebral hypertrophy, and left greater than right neural foraminal narrowing. C4-C5: There is disc bulging, bony ridging, uncovertebral hypertrophy, mild central stenosis, and impingement upon bilateral exiting C5 nerve roots right greater than left. C5-C6: There is disc bulging, bony ridging, uncovertebral hypertrophy, cord impingement, and right greater than left exiting nerve root impingement. C6-C7: There is disc bulging, bony ridging, broad-based posterior disc herniation, cord impingement, and bilateral exiting nerve root impingement.  Physician Disclaimer: I have personally reviewed and confirmed all HPI data with the patient. [] : no

## 2024-03-13 NOTE — ASSESSMENT
[FreeTextEntry1] : A thorough discussion occurred regarding available pain management treatment options including interventional, rehabilitative, pharmacological, and alternative modalities with the patient. We will proceed with the following:  Interventional treatment options: - Proceed with L4-L5 LESI (80 mg Kenalog) with fluoroscopic guidance; caution transitional anatomy - consider repeat right PM C7-T1 ABIOLA with return of severe radicular pain - see additional instructions below  Rehabilitative options: - Continue physical therapy  - Encouraged active participation in HEP as tolerated - Previously provided home exercise sheet  Medication based treatment options: - continue Tylenol 500-1000 mg TID PRN mild-moderate pain - continue Meloxicam 15 mg daily PRN moderate-severe pain, renewed today - Patient uses medication therapy sparingly - see additional instructions below  Complementary treatment options: - Weight management and lifestyle modifications discussed - patient will consider acupuncture in the future if non-sustained relief  Additional treatment recommendations as follows: - Follow-up with orthopedics (Dr. Jolly) as directed - PCP follow-up as directed regarding thyroid nodules - Follow up 1-2 weeks post injection for assessment of efficacy and further treatment recommendations  The risks, benefits and alternatives of the proposed procedure were explained in detail with the patient. The risks outlined include, but are not limited to, infection, bleeding, nerve injury, post dural headache, a temporary increase in pain, failure to resolve symptoms, allergic reaction, and possible elevation of blood sugar in diabetics if using corticosteroid. All questions were answered to patient's apparent satisfaction and he/she verbalized an understanding.  We have discussed the risks, benefits, and alternatives NSAID therapy including but not limited to the risk of bleeding, thrombosis, gastric mucosal irritation/ulceration, allergic reaction and kidney dysfunction; the patient verbalizes an understanding.  The documentation recorded by the scribe, in my presence, accurately reflects the service I personally performed and the decisions made by me with my edits as appropriate.  I, Jaleel Cui acting as scribe, attest that this documentation has been prepared under the direction and in the presence of Provider Andreas Albright DO.

## 2024-03-13 NOTE — PHYSICAL EXAM
[de-identified] : Constitutional:  - No acute distress  - Well developed; well nourished   Neurological:  - normal mood and affect  - alert and oriented x 3    Cardiovascular:  - grossly normal  Lumbar Spine Exam:   Inspection:  erythema (-)  ecchymosis (-)  rashes (-)  alignment: no scoliosis  Palpation:   paraspinal tenderness:                  L (+) ; R (+)  thoracic paraspinal tenderness:    L (-) ; R (-)  sciatic nerve tenderness :             L (-) ; R (-)  SI joint tenderness:                        L (-) ; R (-)  GTB tenderness:                            L (-);  R (-)   ROM:   diminished ROM all planes; moderate stiffness with extremes of extension Pain with extremes of extension   Strength:                   Right       Left     Hip Flexion:                (5-/5)       (5-/5)  Quadriceps:               (5-/5)       (5-/5)  Hamstrings:                (5/5)       (5/5)  Ankle Dorsiflexion:     (5/5)       (5/5)  EHL:                            (5/5)       (5/5)  Ankle Plantarflexion:  (5/5)       (5/5)   Special Tests:  SLR:                      R (=) ; L (=)  Facet loading:       R (+) ; L (+)  GERMAINE test:          R (n/a) ; L (n/a) Tight Hamstrings   R (+) ; L (+)   Neurologic:  Light touch intact throughout LE bilaterally  Reflexes normal and symmetric bilaterally  Gait:  mildly antalgic

## 2024-03-15 ENCOUNTER — APPOINTMENT (OUTPATIENT)
Dept: ORTHOPEDIC SURGERY | Facility: CLINIC | Age: 84
End: 2024-03-15
Payer: MEDICARE

## 2024-03-15 PROCEDURE — 99024 POSTOP FOLLOW-UP VISIT: CPT

## 2024-03-15 RX ORDER — DOXYCYCLINE HYCLATE 100 MG/1
100 TABLET ORAL
Qty: 14 | Refills: 0 | Status: ACTIVE | COMMUNITY
Start: 2024-03-15 | End: 1900-01-01

## 2024-03-15 NOTE — PHYSICAL EXAM
[Right] : right hip [] : no tenderness [FreeTextEntry3] : MIN LOCALIZED SWELLING SURROUNDING DISTAL 1/3 OF WOULD WITH FIBRINOUS DISCHARGE MONOCRYL SUTURE NOTED , NO ERYTHEMA [FreeTextEntry9] : WAS NOT ASSESSED.  [de-identified] : WAS NOT ASSESSED.

## 2024-03-15 NOTE — HISTORY OF PRESENT ILLNESS
[4] : 4 [0] : 0 [Burning] : burning [Dull/Aching] : dull/aching [Intermittent] : intermittent [Household chores] : household chores [Walking] : walking [de-identified] : 03/15/24 FOLLOW UP S/P RIGHT ORQUIDEA  NOTICED SUTURES ARE COMING OUT. DISCHARGE FROM WOUND NO PAIN NO FEVERS NO CHILLS [] : no [FreeTextEntry1] : RIGHT HIP  [de-identified] : DARRYL HEARD [de-identified] : PT  [FreeTextEntry5] : here for post op/ R hip feels great.

## 2024-03-15 NOTE — ASSESSMENT
[FreeTextEntry1] : S/P RT ORQUIDEA 12/27/23: DOING WELL. XRAYS REVIEWED WITH COMPONENTS WELL FIXED. NO SIGNS OF INFECTION. PROPER ELEVATION TECHNIQUES DISCUSSED. ABX AND PRECAUTIONS REVIEWED. PT RX. QUESTIONS ANSWERED.   SMALL FIBRINOUS EXUDATE WITH ROUTINE HEALING NEAR SUTURE SITE. NO F/C/S. NO ERYTHEMA, INDURATION, DRAINAGE. PROBED AND SUPERFICIAL.  WILL ADD DOXY 100 BID AND FOLLOW UP IN 1 WEEK  WILL FOLLOW UP IN 1 WEEK FOR RECHECK.

## 2024-03-22 ENCOUNTER — APPOINTMENT (OUTPATIENT)
Dept: ORTHOPEDIC SURGERY | Facility: CLINIC | Age: 84
End: 2024-03-22
Payer: MEDICARE

## 2024-03-22 DIAGNOSIS — Z96.641 PRESENCE OF RIGHT ARTIFICIAL HIP JOINT: ICD-10-CM

## 2024-03-22 PROCEDURE — 73502 X-RAY EXAM HIP UNI 2-3 VIEWS: CPT

## 2024-03-22 PROCEDURE — 99024 POSTOP FOLLOW-UP VISIT: CPT

## 2024-03-22 NOTE — PHYSICAL EXAM
[Right] : right hip [FreeTextEntry3] : MIN LOCALIZED SWELLING SURROUNDING DISTAL 1/3 OF WOULD WITH FIBRINOUS DISCHARGE MONOCRYL SUTURE NOTED , NO ERYTHEMA [] : no tenderness [FreeTextEntry9] : WAS NOT ASSESSED.  [de-identified] : WAS NOT ASSESSED.

## 2024-03-22 NOTE — ASSESSMENT
[FreeTextEntry1] : S/P RT ORQUIDEA 12/27/23: DOING WELL. XRAYS REVIEWED WITH COMPONENTS WELL FIXED. NO SIGNS OF INFECTION. PROPER ELEVATION TECHNIQUES DISCUSSED. ABX AND PRECAUTIONS REVIEWED. PT RX. QUESTIONS ANSWERED.   WOUND CLEAN AND DRY. SHE IS FEELING MUCH BETTER AFTER SUTURE REMOVAL.

## 2024-03-22 NOTE — HISTORY OF PRESENT ILLNESS
[4] : 4 [0] : 0 [Burning] : burning [Intermittent] : intermittent [Dull/Aching] : dull/aching [Household chores] : household chores [Walking] : walking [de-identified] : 03/15/24 FOLLOW UP S/P RIGHT ORQUIDEA  NOTICED SUTURES ARE COMING OUT. DISCHARGE FROM WOUND NO PAIN NO FEVERS NO CHILLS  3.22.24 DOS 12.27.23 RIGHT ORQUIDEA.  PAIN IMPROVING [] : no [FreeTextEntry5] : here for post op/ R hip feels great. [FreeTextEntry1] : RIGHT HIP  [de-identified] : PT  [de-identified] : DARRYL HEARD Tazorac Pregnancy And Lactation Text: This medication is not safe during pregnancy. It is unknown if this medication is excreted in breast milk.

## 2024-03-27 ENCOUNTER — APPOINTMENT (OUTPATIENT)
Dept: PAIN MANAGEMENT | Facility: CLINIC | Age: 84
End: 2024-03-27
Payer: MEDICARE

## 2024-03-27 PROCEDURE — 62323 NJX INTERLAMINAR LMBR/SAC: CPT

## 2024-03-27 NOTE — PROCEDURE
[FreeTextEntry3] : Date of Service: 03/27/2024   Account: [id number]  Patient: JOHANNE JOSHI   YOB: 1940  Age: 83 years  Surgeon:      Andreas Albright DO  Assistant:    None  Pre-Operative Diagnosis:         Lumbosacral Radiculitis (M54.17)  Post Operative Diagnosis:       Lumbosacral Radiculitis (M54.17)     Procedure:             Lumbar interlaminar (L4-5) epidural steroid injection under fluoroscopic guidance  Anesthesia:           MAC  This procedure was carried out using fluoroscopic guidance.  The risks and benefits of the procedure were discussed extensively with the patient.  The consent of the patient was obtained and the following procedure was performed.  A timeout was performed with all essential staff present and the site and side were verified.  The patient was placed in the prone position with a pillow under the abdomen to minimize the lumbar lordosis.  The lumbar area was prepped and draped in a sterile fashion.  Under A/P view with slight cephalad-caudad angulation, the L4-5 interspace was identified and marked.  Using sterile technique the superficial skin was anesthetized with 1% Lidocaine.  A 20-gauge Tuohy needle was advanced into the epidural space under fluoroscopy using loss of resistance at the L4-L5 level.  After negative aspiration for heme or CSF, an epidurogram was obtained in the A/P and lateral fluoroscopic views using 2-3 cc of Omnipaque contrast confirming epidural placement of the needle.  After this, 5 cc of a mixture of preservative free normal saline and 80 mg of Kenalog were injected into the epidural space.   Vital signs remained normal throughout the procedure.  The patient tolerated the procedure well.  There were no immediate complications from the performed procedure.  The patient was instructed to apply ice over the injection sites for twenty minutes every two hours for the next 24 hours.  Disposition:      1. The patient was advised to F/U in 1-2 weeks to assess the response to the injection.      2. The patient was also instructed to contact me immediately if there were any concerns related to the procedure performed.

## 2024-04-09 ENCOUNTER — APPOINTMENT (OUTPATIENT)
Dept: ORTHOPEDIC SURGERY | Facility: CLINIC | Age: 84
End: 2024-04-09

## 2024-04-09 ENCOUNTER — APPOINTMENT (OUTPATIENT)
Dept: PAIN MANAGEMENT | Facility: CLINIC | Age: 84
End: 2024-04-09
Payer: MEDICARE

## 2024-04-09 VITALS — BODY MASS INDEX: 24.91 KG/M2 | WEIGHT: 155 LBS | HEIGHT: 66 IN

## 2024-04-09 DIAGNOSIS — M48.061 SPINAL STENOSIS, LUMBAR REGION WITHOUT NEUROGENIC CLAUDICATION: ICD-10-CM

## 2024-04-09 DIAGNOSIS — M54.16 RADICULOPATHY, LUMBAR REGION: ICD-10-CM

## 2024-04-09 DIAGNOSIS — M47.816 SPONDYLOSIS W/OUT MYELOPATHY OR RADICULOPATHY, LUMBAR REGION: ICD-10-CM

## 2024-04-09 PROCEDURE — 99213 OFFICE O/P EST LOW 20 MIN: CPT

## 2024-04-09 NOTE — PHYSICAL EXAM
[de-identified] : Constitutional:  - No acute distress  - Well developed; well nourished   Neurological:  - normal mood and affect  - alert and oriented x 3    Cardiovascular:  - grossly normal  Lumbar Spine Exam:   Inspection:  erythema (-)  ecchymosis (-)  rashes (-)  alignment: no scoliosis; exaggerated lumbar lordosis  Palpation:   paraspinal tenderness:                  L (+) ; R (+)  thoracic paraspinal tenderness:    L (-) ; R (-)  sciatic nerve tenderness :             L (-) ; R (-)  SI joint tenderness:                        L (-) ; R (-)  GTB tenderness:                            L (-);  R (-)   ROM: WNL; stiffness with extremes of extension Pain with extremes of extension   Strength:                   Right       Left     Hip Flexion:                (5/5)       (5/5)  Quadriceps:               (5/5)       (5/5)  Hamstrings:                (5/5)       (5/5)  Ankle Dorsiflexion:     (5/5)       (5/5)  EHL:                            (5/5)       (5/5)  Ankle Plantarflexion:  (5/5)       (5/5)   Special Tests:  SLR:                      R (-) ; L (-)  Facet loading:       R (+) ; L (+)  GERMAINE test:          R (n/a) ; L (n/a) Tight Hamstrings   R (+) ; L (+)   Neurologic:  Light touch intact throughout LE bilaterally  Reflexes normal and symmetric bilaterally  Gait:  mildly antalgic

## 2024-04-09 NOTE — HISTORY OF PRESENT ILLNESS
[Neck] : neck [Lower back] : lower back [Gradual] : gradual [7] : 7 [Burning] : burning [Shooting] : shooting [Constant] : constant [Household chores] : household chores [Leisure] : leisure [Rest] : rest [Standing] : standing [Walking] : walking [FreeTextEntry1] : 2024 - Patient presents for FUV after a L4-5 LESI on 3/27/2024. Patient reports a 90% reduction of pain s/p epidural, she still has some minor stiffness and pain in the left side of the lower back.  She continues to go to formal PT with meaningful benefit.   3/12/2024 - Patient presents for FUV after a bilateral L4-5, L5-S1 facet block on 3/6/2024. Patient had no meaningful relief the day of the procedure. Her pain is across the lower back (L>R) with radiation bilaterally to the buttocks, left hip, and occasional jolts of shooting pain down the left lower extremity.   2024 - Patient presents for FUV after a L4-5 LESI on 2023.  She no longer has pain buttock/groin, improved s/p R THR with Dr. Jolly (DOS 23).  She is experiencing a painful exacerbation of LBP, feels like her spine is "out of whack". Radiates up and down her spine, R>L, "shock-like" in nature. Starts up pain after sitting. Worse in the am. No loss or b/b control.  No meds for pain. Aggravated by using cane for 10 days post op.  10/17/23 - Patient presents for FUV regarding their lower back pain. She was referred by Dr. Cook for repeat LESI.  The patient reports over the past 2-3 months she has noticed and increased in right buttock pain radiating to the right groin and anterior thigh.  The patient was also referred to hip specialist by Dr. Cook for further evaluation of her groin pain.    Regarding her neck and shoulder, she complains of focal right shoulder pain with decreased ROM.  She had a ABIOLA in May with good resolution of her radiating neck pain but the shoulder ROM has been declining.  23 - Patient presents for FUV after a bilateral L4-5, L5-S1 facet block on 2023. Patient reports greater than 80% reduction in her lower back pain for 6-8 hours the day of the facet block before symptoms returned to baseline.  She does report a return of her right buttock and right thigh pain as well.  She has responded well to lumbar ROSALEE for resolution of the symptoms.  23- Patient presents for FUV after a C7-T1 ABIOLA on 2023.  Patient reports 80% relief following the injection.  Pleased with response.  She wants to address her lower back pain.  She complains predominantly axial lower back worse with getting up from sitting.  Occasional radiation to her right butt.    23 - Patient presents for FUV after a Lumbar and Cervical MRI on 2023.  Complains predominately of neck pain with radiation to the right shoulder and upper extremity.  Again she feels that this is a newer pain from her chronic longstanding issues.  Uses medication therapy sparingly.  23 - Patient presents to the office for reevaluation; last seen in 2022.  Patient complains of neck, right shoulder pain (new symptom), and lower back pain. Patient states that her pain got worse since the holidays so she scheduled a visit for possible interventional therapy. Patient has not seen other orthopedic specialist for neck or back or right shoulder. She states that one symptom isn't worse than the other they all kind of blend with each other.  Patient takes meloxicam sparingly. She is not in physical therapy at this time.  In regard to her neck she complains of decreased ROM and LUE paraesthesias when she sleeps.  Patient has right shoulder pain with ROM movements.  In regard to her back she complains of axial back pain along the belt line with mild radiation to b/l buttocks.    22 - Patient presents for a FUV following an L4-5 LESI on 22. Patient reports experiencing 3 day pain flare-up which has resolved. She reports benefit following the injection. Pain is at an acceptable level.   22 - Patient presents for a FUV. Last seen 6 months ago.  Patient c/o low back pain radiating to the bilateral buttocks and to the RLE. She reports benefit with ROSALEE's in the past. Patient reports no observable benefit with knee gel injections x 4 with Dr. Glasgow. She reports some weakness and paraesthesia to the RLE.   21 - Patient presents for a FUV following a Left Paramedian L4-5 LESI on 11/10/21. Patient reports her radicular left leg pain was completely resolved following her last LESI. She is pleased with the results of her injection.   21 - Patient presents for a FUV. Patient reports that she has had an increase of lower back pain radiating into her left hip which began two weeks ago. Patient reports that prior to two weeks ago she didn't experience this pain since her previous ROSALEE. Patient reports that this acute flare up feels worse than her previous episodes. Patient has been taking meloxicam without significant relief.  21 - FUV after L4-L5 LESI on 21. She reports 75% improvement of her low back and right radicular leg pain. She has increased her activity level with reduction of pain. She is pleased with response to ROSALEE. No new complaints.   10/22/2020 - FUV for imaging review regarding her low back pain. Feels her low back pain has improved since initial fall until this morning. Feels increased stiffness in the morning which improves as the day continues. MRI reviewed in detail.   10/8/2020 - Patient complains of new injury. She reports fall 1 week ago down her stairs on her buttocks. No pain initially but for several days following developed increasing pain in low back into right buttock and posterior thigh. Was put on MDP for different issue which improved some of her back and leg pain but is fearful pain will return now that she completed oral steroids. No change in pain when sitting/standing/laying down.   Injections:  1) C7-T1 ABIOLA (19, 2023) 2) Right C3-C4, C4-C5, C5-C6 facet joint RFA (20) 3) L4-5 LESI (22, 23, 3/27/2024)  4) Bilateral L4-5, L5-S1 facet block (2023, 3/6/2024)  Pertinent Surgical History: N/A  Imagin) MRI Lumbar Spine (2023) - [facility] Findings: There is exaggerated lumbar lordosis with grade 1 anterolisthesis at L4-L5 and L5-S1. There is slight retrolisthesis at the thoracolumbar junction. The conus appears unremarkable. There is no acute vertebral body fracture. There are no gross paravertebral soft tissue masses. There are findings suggesting multiple cysts within bilateral kidneys right greater than left measuring up to 5-6 cm in the inferior right kidney incompletely evaluated on the current exam. There is no gross paravertebral soft tissue mass. L1-L2: There is disc bulging, bony ridging, facet arthrosis, and moderate bilateral foraminal narrowing. L2-L3: There is diffuse disc bulging, bony ridging, facet arthrosis, and moderate bilateral foraminal narrowing. L3-L4: There is disc bulging, bony ridging, facet arthrosis, broad-based posterior disc herniation, mild central stenosis, and left greater than right exiting L3 nerve root impingement. L4-L5: There is anterolisthesis, disc bulging, bony ridging, facet arthrosis, moderate central stenosis, and bilateral exiting L4 nerve root impingement. L5-S1: There is anterolisthesis, disc bulging, bony ridging, facet arthrosis, mild central stenosis, and bilateral exiting L5 nerve root impingement.  2) MRI Cervical Spine (2023) - [facility] Findings: There is straightening of the cervical lordosis and multilevel disc desiccation with loss of disc height and mild degenerative endplate changes in the mid-to-lower cervical spine without acute vertebral body fracture. There is multilevel uncovertebral hypertrophy. The visualized posterior fossa structures appear unremarkable. There are no gross paravertebral soft tissue masses. C2-C3: There is disc bulging, bony ridging, uncovertebral hypertrophy, and left greater than right neural foraminal narrowing. C3-C4: There is disc bulging, bony ridging, uncovertebral hypertrophy, and left greater than right neural foraminal narrowing. C4-C5: There is disc bulging, bony ridging, uncovertebral hypertrophy, mild central stenosis, and impingement upon bilateral exiting C5 nerve roots right greater than left. C5-C6: There is disc bulging, bony ridging, uncovertebral hypertrophy, cord impingement, and right greater than left exiting nerve root impingement. C6-C7: There is disc bulging, bony ridging, broad-based posterior disc herniation, cord impingement, and bilateral exiting nerve root impingement.  Physician Disclaimer: I have personally reviewed and confirmed all HPI data with the patient. [] : no

## 2024-04-09 NOTE — ASSESSMENT
[FreeTextEntry1] : A thorough discussion occurred regarding available pain management treatment options including interventional, rehabilitative, pharmacological, and alternative modalities with the patient. We will proceed with the following:  Interventional treatment options: - None indicated at present time - Would likely repeat L4-L5 LESI (80 mg Kenalog) with return of severe radicular pain (caution transitional anatomy) - will call - consider repeat right PM C7-T1 ABIOLA with return of severe radicular pain - see additional instructions below  Rehabilitative options: - Continue physical therapy as per orthopedics - Encouraged active participation in HEP as tolerated - Previously provided home exercise sheet  Medication based treatment options: - continue Tylenol 500-1000 mg TID PRN mild-moderate pain - continue Meloxicam 15 mg daily PRN moderate-severe pain - Patient uses medication therapy sparingly - see additional instructions below  Complementary treatment options: - Weight management and lifestyle modifications discussed - patient will consider acupuncture in the future if non-sustained relief  Additional treatment recommendations as follows: - PCP follow-up as directed regarding thyroid nodules - Follow-up in 3 months or as needed basis  We have discussed the risks, benefits, and alternatives NSAID therapy including but not limited to the risk of bleeding, thrombosis, gastric mucosal irritation/ulceration, allergic reaction and kidney dysfunction; the patient verbalizes an understanding.  The documentation recorded by the scribe, in my presence, accurately reflects the service I personally performed and the decisions made by me with my edits as appropriate.  I, Jaleel Cui acting as scribe, attest that this documentation has been prepared under the direction and in the presence of Provider Andreas Albright DO.

## 2024-04-12 ENCOUNTER — APPOINTMENT (OUTPATIENT)
Dept: ORTHOPEDIC SURGERY | Facility: CLINIC | Age: 84
End: 2024-04-12
Payer: MEDICARE

## 2024-04-12 VITALS — HEIGHT: 66 IN | BODY MASS INDEX: 24.91 KG/M2 | WEIGHT: 155 LBS

## 2024-04-12 DIAGNOSIS — M25.542 PAIN IN JOINTS OF RIGHT HAND: ICD-10-CM

## 2024-04-12 DIAGNOSIS — M25.541 PAIN IN JOINTS OF RIGHT HAND: ICD-10-CM

## 2024-04-12 PROCEDURE — 99213 OFFICE O/P EST LOW 20 MIN: CPT | Mod: 25

## 2024-04-12 PROCEDURE — 20600 DRAIN/INJ JOINT/BURSA W/O US: CPT | Mod: RT

## 2024-04-12 PROCEDURE — 73130 X-RAY EXAM OF HAND: CPT | Mod: 50

## 2024-04-12 NOTE — HISTORY OF PRESENT ILLNESS
[Dull/Aching] : dull/aching [Localized] : localized [Shooting] : shooting [Tightness] : tightness [de-identified] : 83 year old female followed for RT ring finger PIP arthritis. she was given a CSI in June 2022. She is complaining of pain in both hands today.   [] : no [FreeTextEntry1] : b/l wrist [FreeTextEntry5] : b/l wrist/fingers pain with swelling

## 2024-04-12 NOTE — DISCUSSION/SUMMARY
[de-identified] : Discussed the nature of the diagnosis and risk and benefits of different modalities of treatment. b/l hand x-rays reviewed and discussed. Discussed conservative management vs CSI.  Pt would like a CSI in the RT and RT ring PIP.  Done today and tolerated well. All questions answered.

## 2024-04-12 NOTE — PROCEDURE
[Small Joint Injection] : small joint injection [Pain] : pain [Inflammation] : inflammation [Alcohol] : alcohol [Ethyl Chloride sprayed topically] : ethyl chloride sprayed topically [Sterile technique used] : sterile technique used [___ cc    1%] : Lidocaine ~Vcc of 1%  [___ cc    10mg] : Triamcinolone (Kenalog) ~Vcc of 10 mg  [] : Patient tolerated procedure well [Risks, benefits, alternatives discussed / Verbal consent obtained] : the risks benefits, and alternatives have been discussed, and verbal consent was obtained [Right] : of the right [2nd] : 2nd [4th] : 4th

## 2024-05-01 RX ORDER — MELOXICAM 15 MG/1
15 TABLET ORAL
Qty: 30 | Refills: 2 | Status: ACTIVE | COMMUNITY
Start: 2024-02-19 | End: 1900-01-01

## 2024-05-01 RX ORDER — DICLOFENAC SODIUM 75 MG/1
75 TABLET, DELAYED RELEASE ORAL TWICE DAILY
Qty: 30 | Refills: 2 | Status: DISCONTINUED | COMMUNITY
Start: 2023-09-11 | End: 2024-05-01

## 2024-05-10 ENCOUNTER — APPOINTMENT (OUTPATIENT)
Dept: ORTHOPEDIC SURGERY | Facility: CLINIC | Age: 84
End: 2024-05-10
Payer: MEDICARE

## 2024-05-10 VITALS — BODY MASS INDEX: 24.91 KG/M2 | HEIGHT: 66 IN | WEIGHT: 155 LBS

## 2024-05-10 DIAGNOSIS — M19.042 PRIMARY OSTEOARTHRITIS, RIGHT HAND: ICD-10-CM

## 2024-05-10 DIAGNOSIS — M19.041 PRIMARY OSTEOARTHRITIS, RIGHT HAND: ICD-10-CM

## 2024-05-10 DIAGNOSIS — M15.2 BOUCHARD'S NODES (WITH ARTHROPATHY): ICD-10-CM

## 2024-05-10 PROCEDURE — 20600 DRAIN/INJ JOINT/BURSA W/O US: CPT | Mod: RT

## 2024-05-10 PROCEDURE — 99213 OFFICE O/P EST LOW 20 MIN: CPT | Mod: 25

## 2024-05-10 NOTE — HISTORY OF PRESENT ILLNESS
[Dull/Aching] : dull/aching [Localized] : localized [Shooting] : shooting [Tightness] : tightness [de-identified] : 3 year old female followed for RT index ring finger PIP arthritis. she was given a CSI in both RT index and ring PIP's 4 weeks ago with good releif. Today, she is c/o of middle MP tenderness.   [] : no [FreeTextEntry1] : b/l wrist [FreeTextEntry5] : b/l wrist/fingers pain with swelling

## 2024-05-10 NOTE — PROCEDURE
[Small Joint Injection] : small joint injection [Right] : of the right [4th] : 4th [Metacarpalphalangeal joint] : metacarpalphalangeal joint [Pain] : pain [Inflammation] : inflammation [Alcohol] : alcohol [Ethyl Chloride sprayed topically] : ethyl chloride sprayed topically [Sterile technique used] : sterile technique used [___ cc    1%] : Lidocaine ~Vcc of 1%  [___ cc    10mg] : Triamcinolone (Kenalog) ~Vcc of 10 mg  [] : Patient tolerated procedure well [Risks, benefits, alternatives discussed / Verbal consent obtained] : the risks benefits, and alternatives have been discussed, and verbal consent was obtained

## 2024-05-10 NOTE — DISCUSSION/SUMMARY
[de-identified] : Discussed the nature of the diagnosis and risk and benefits of different modalities of treatment.  RT middle MP arthritis. Discussed conservative management vs CSI.  Pt would like a CSI. Done today and tolerated well. All questions answered.

## 2024-07-09 ENCOUNTER — NON-APPOINTMENT (OUTPATIENT)
Age: 84
End: 2024-07-09

## 2024-07-09 ENCOUNTER — APPOINTMENT (OUTPATIENT)
Dept: PAIN MANAGEMENT | Facility: CLINIC | Age: 84
End: 2024-07-09
Payer: MEDICARE

## 2024-07-09 VITALS — HEIGHT: 66 IN | WEIGHT: 156 LBS | BODY MASS INDEX: 25.07 KG/M2

## 2024-07-09 DIAGNOSIS — M48.061 SPINAL STENOSIS, LUMBAR REGION WITHOUT NEUROGENIC CLAUDICATION: ICD-10-CM

## 2024-07-09 DIAGNOSIS — M47.816 SPONDYLOSIS W/OUT MYELOPATHY OR RADICULOPATHY, LUMBAR REGION: ICD-10-CM

## 2024-07-09 DIAGNOSIS — M25.50 PAIN IN UNSPECIFIED JOINT: ICD-10-CM

## 2024-07-09 PROCEDURE — 99214 OFFICE O/P EST MOD 30 MIN: CPT

## 2024-07-09 RX ORDER — CELECOXIB 200 MG/1
200 CAPSULE ORAL
Qty: 30 | Refills: 1 | Status: ACTIVE | COMMUNITY
Start: 2024-07-09 | End: 1900-01-01

## 2024-09-05 ENCOUNTER — RX RENEWAL (OUTPATIENT)
Age: 84
End: 2024-09-05

## 2024-09-27 ENCOUNTER — APPOINTMENT (OUTPATIENT)
Dept: ORTHOPEDIC SURGERY | Facility: CLINIC | Age: 84
End: 2024-09-27
Payer: MEDICARE

## 2024-09-27 DIAGNOSIS — M19.041 PRIMARY OSTEOARTHRITIS, RIGHT HAND: ICD-10-CM

## 2024-09-27 DIAGNOSIS — M15.2 BOUCHARD'S NODES (WITH ARTHROPATHY): ICD-10-CM

## 2024-09-27 DIAGNOSIS — M19.042 PRIMARY OSTEOARTHRITIS, RIGHT HAND: ICD-10-CM

## 2024-09-27 PROCEDURE — 20600 DRAIN/INJ JOINT/BURSA W/O US: CPT | Mod: RT

## 2024-09-27 PROCEDURE — 99213 OFFICE O/P EST LOW 20 MIN: CPT | Mod: 25

## 2024-09-27 NOTE — PROCEDURE
[2nd] : 2nd [Small Joint Injection] : small joint injection [Right] : of the right [3rd] : 3rd [Metacarpalphalangeal joint] : metacarpalphalangeal joint [Pain] : pain [Inflammation] : inflammation [Alcohol] : alcohol [Ethyl Chloride sprayed topically] : ethyl chloride sprayed topically [Sterile technique used] : sterile technique used [___ cc    1%] : Lidocaine ~Vcc of 1%  [___ cc    10mg] : Triamcinolone (Kenalog) ~Vcc of 10 mg  [Risks, benefits, alternatives discussed / Verbal consent obtained] : the risks benefits, and alternatives have been discussed, and verbal consent was obtained

## 2024-09-27 NOTE — HISTORY OF PRESENT ILLNESS
[de-identified] : 3 year old female followed for RT index ring finger PIP arthritis. she was given a CSI in both RT index and ring PIP's with good relief. RT index and middle are most bothersome

## 2024-09-27 NOTE — DISCUSSION/SUMMARY
[de-identified] : Discussed the nature of the diagnosis and risk and benefits of different modalities of treatment.  RT index middle MP arthritis. Discussed conservative management vs CSI.  Pt would like a CSI in RT Index & Middle  Done today and tolerated well. All questions answered.

## 2024-09-27 NOTE — H&P PST ADULT - PROBLEM/PLAN-2
[FreeTextEntry1] : 18 year old female presents to clinic for follow-up Chlamydia and BV treatment. 1. Chlamydia -Stopped Doxycycline due to untoward side effects patient is experiencing (vomiting). -Provided patient with Azithromycin 1gram PO x1 now under direct observation. -Abstain from sex x1 week -F/U in 4-6 weeks for repeat testing  2. BV -Pt did not  Rx at pharmacy -Dispensed Metronidazole vaginal gel to patient for use nightly x 5 days. -No sex x1 week, and until symptoms resolve completely.  F/U scheduled for next week to initiate Depo-provera for pregnancy prevention.  DISPLAY PLAN FREE TEXT

## 2024-09-27 NOTE — PHYSICAL EXAM
[Right] : right hand [Left] : left hand [2nd] : 2nd [3rd] : 3rd [MCP Joint] : MCP joint [de-identified] : index dip

## 2024-10-10 NOTE — DISCHARGE NOTE PROVIDER - NSDCCPGOAL_GEN_ALL_CORE_FT
COVID and FLU negative    Discussed diagnosis and treatment of URI.  Discussed the importance of avoiding unnecessary antibiotic therapy.  Suggested symptomatic OTC remedies. Mucinex D  Tessalon for cough   Flonase BID  Nasal saline spray for congestion. Use after warm shower. Then wait for 30 min and do Flonase.   Humidifier  Tylenol 500 mg TID or ibuprofen 600 mg with meals  Aggressive fluids  Buckwheat honey for possible cough  Follow up if symptoms aren't resolving.  Follow up with PCP as needed    
To get better and follow your care plan as instructed.

## 2024-11-01 DIAGNOSIS — M79.18 MYALGIA, OTHER SITE: ICD-10-CM

## 2024-11-01 RX ORDER — METHOCARBAMOL 750 MG/1
750 TABLET, FILM COATED ORAL TWICE DAILY
Qty: 60 | Refills: 2 | Status: ACTIVE | COMMUNITY
Start: 2024-11-01 | End: 1900-01-01

## 2024-11-08 ENCOUNTER — NON-APPOINTMENT (OUTPATIENT)
Age: 84
End: 2024-11-08

## 2025-01-09 ENCOUNTER — APPOINTMENT (OUTPATIENT)
Dept: PAIN MANAGEMENT | Facility: CLINIC | Age: 85
End: 2025-01-09

## 2025-01-10 ENCOUNTER — APPOINTMENT (OUTPATIENT)
Dept: ORTHOPEDIC SURGERY | Facility: CLINIC | Age: 85
End: 2025-01-10

## 2025-01-10 VITALS — HEIGHT: 66 IN | BODY MASS INDEX: 25.07 KG/M2 | WEIGHT: 156 LBS

## 2025-01-10 DIAGNOSIS — M19.042 PRIMARY OSTEOARTHRITIS, LEFT HAND: ICD-10-CM

## 2025-01-10 DIAGNOSIS — M15.2 BOUCHARD'S NODES (WITH ARTHROPATHY): ICD-10-CM

## 2025-01-10 DIAGNOSIS — M19.041 PRIMARY OSTEOARTHRITIS, RIGHT HAND: ICD-10-CM

## 2025-01-10 PROCEDURE — 20600 DRAIN/INJ JOINT/BURSA W/O US: CPT | Mod: 50

## 2025-01-10 PROCEDURE — 73130 X-RAY EXAM OF HAND: CPT | Mod: LT

## 2025-01-13 ENCOUNTER — APPOINTMENT (OUTPATIENT)
Dept: ORTHOPEDIC SURGERY | Facility: CLINIC | Age: 85
End: 2025-01-13

## 2025-01-14 ENCOUNTER — APPOINTMENT (OUTPATIENT)
Dept: ORTHOPEDIC SURGERY | Facility: CLINIC | Age: 85
End: 2025-01-14
Payer: MEDICARE

## 2025-01-14 VITALS — HEIGHT: 66 IN | BODY MASS INDEX: 25.07 KG/M2 | WEIGHT: 156 LBS

## 2025-01-14 DIAGNOSIS — Z96.641 PRESENCE OF RIGHT ARTIFICIAL HIP JOINT: ICD-10-CM

## 2025-01-14 DIAGNOSIS — M17.11 UNILATERAL PRIMARY OSTEOARTHRITIS, RIGHT KNEE: ICD-10-CM

## 2025-01-14 PROCEDURE — 20610 DRAIN/INJ JOINT/BURSA W/O US: CPT | Mod: RT

## 2025-01-14 PROCEDURE — 99214 OFFICE O/P EST MOD 30 MIN: CPT | Mod: 25

## 2025-01-14 PROCEDURE — J3490M: CUSTOM | Mod: JZ

## 2025-01-14 PROCEDURE — 73564 X-RAY EXAM KNEE 4 OR MORE: CPT | Mod: RT

## 2025-01-16 ENCOUNTER — APPOINTMENT (OUTPATIENT)
Dept: ORTHOPEDIC SURGERY | Facility: CLINIC | Age: 85
End: 2025-01-16

## 2025-01-31 ENCOUNTER — APPOINTMENT (OUTPATIENT)
Dept: ORTHOPEDIC SURGERY | Facility: CLINIC | Age: 85
End: 2025-01-31

## 2025-02-03 ENCOUNTER — APPOINTMENT (OUTPATIENT)
Dept: PAIN MANAGEMENT | Facility: CLINIC | Age: 85
End: 2025-02-03

## 2025-03-20 ENCOUNTER — APPOINTMENT (OUTPATIENT)
Dept: ORTHOPEDIC SURGERY | Facility: CLINIC | Age: 85
End: 2025-03-20
Payer: MEDICARE

## 2025-03-20 VITALS — BODY MASS INDEX: 24.11 KG/M2 | WEIGHT: 150 LBS | HEIGHT: 66 IN

## 2025-03-20 DIAGNOSIS — M54.12 RADICULOPATHY, CERVICAL REGION: ICD-10-CM

## 2025-03-20 DIAGNOSIS — M19.011 PRIMARY OSTEOARTHRITIS, RIGHT SHOULDER: ICD-10-CM

## 2025-03-20 PROCEDURE — 99214 OFFICE O/P EST MOD 30 MIN: CPT

## 2025-03-20 PROCEDURE — 73030 X-RAY EXAM OF SHOULDER: CPT | Mod: RT

## 2025-03-20 PROCEDURE — 73010 X-RAY EXAM OF SHOULDER BLADE: CPT | Mod: RT

## 2025-03-20 PROCEDURE — 72040 X-RAY EXAM NECK SPINE 2-3 VW: CPT

## 2025-03-20 RX ORDER — METHYLPREDNISOLONE 4 MG/1
4 TABLET ORAL
Qty: 1 | Refills: 0 | Status: ACTIVE | COMMUNITY
Start: 2025-03-20 | End: 1900-01-01

## 2025-03-28 ENCOUNTER — APPOINTMENT (OUTPATIENT)
Dept: ORTHOPEDIC SURGERY | Facility: CLINIC | Age: 85
End: 2025-03-28
Payer: MEDICARE

## 2025-03-28 VITALS — WEIGHT: 150 LBS | BODY MASS INDEX: 24.11 KG/M2 | HEIGHT: 66 IN

## 2025-03-28 DIAGNOSIS — M50.90 CERVICAL DISC DISORDER, UNSPECIFIED, UNSPECIFIED CERVICAL REGION: ICD-10-CM

## 2025-03-28 PROCEDURE — 99214 OFFICE O/P EST MOD 30 MIN: CPT

## 2025-03-28 RX ORDER — GABAPENTIN 300 MG/1
300 CAPSULE ORAL
Qty: 30 | Refills: 0 | Status: ACTIVE | COMMUNITY
Start: 2025-03-28 | End: 2025-04-27

## 2025-03-29 ENCOUNTER — APPOINTMENT (OUTPATIENT)
Dept: MRI IMAGING | Facility: CLINIC | Age: 85
End: 2025-03-29
Payer: MEDICARE

## 2025-03-29 ENCOUNTER — APPOINTMENT (OUTPATIENT)
Dept: MRI IMAGING | Facility: CLINIC | Age: 85
End: 2025-03-29

## 2025-03-29 PROCEDURE — 72141 MRI NECK SPINE W/O DYE: CPT

## 2025-04-01 ENCOUNTER — APPOINTMENT (OUTPATIENT)
Dept: PAIN MANAGEMENT | Facility: CLINIC | Age: 85
End: 2025-04-01
Payer: MEDICARE

## 2025-04-01 VITALS — HEIGHT: 66 IN | BODY MASS INDEX: 24.11 KG/M2 | WEIGHT: 150 LBS

## 2025-04-01 DIAGNOSIS — M54.12 RADICULOPATHY, CERVICAL REGION: ICD-10-CM

## 2025-04-01 PROCEDURE — 99214 OFFICE O/P EST MOD 30 MIN: CPT

## 2025-04-01 RX ORDER — PREGABALIN 50 MG/1
50 CAPSULE ORAL 3 TIMES DAILY
Qty: 90 | Refills: 0 | Status: ACTIVE | COMMUNITY
Start: 2025-04-01 | End: 1900-01-01

## 2025-04-15 ENCOUNTER — APPOINTMENT (OUTPATIENT)
Dept: PAIN MANAGEMENT | Facility: CLINIC | Age: 85
End: 2025-04-15
Payer: MEDICARE

## 2025-04-15 VITALS — HEIGHT: 66 IN | WEIGHT: 154 LBS | BODY MASS INDEX: 24.75 KG/M2

## 2025-04-15 DIAGNOSIS — M47.22 OTHER SPONDYLOSIS WITH RADICULOPATHY, CERVICAL REGION: ICD-10-CM

## 2025-04-15 DIAGNOSIS — G95.9 DISEASE OF SPINAL CORD, UNSPECIFIED: ICD-10-CM

## 2025-04-15 DIAGNOSIS — M54.12 RADICULOPATHY, CERVICAL REGION: ICD-10-CM

## 2025-04-15 PROCEDURE — 99215 OFFICE O/P EST HI 40 MIN: CPT

## 2025-04-16 ENCOUNTER — APPOINTMENT (OUTPATIENT)
Dept: PAIN MANAGEMENT | Facility: CLINIC | Age: 85
End: 2025-04-16

## 2025-04-22 ENCOUNTER — OUTPATIENT (OUTPATIENT)
Dept: OUTPATIENT SERVICES | Facility: HOSPITAL | Age: 85
LOS: 1 days | End: 2025-04-22
Payer: MEDICARE

## 2025-04-22 VITALS
WEIGHT: 151.9 LBS | DIASTOLIC BLOOD PRESSURE: 80 MMHG | SYSTOLIC BLOOD PRESSURE: 120 MMHG | OXYGEN SATURATION: 96 % | RESPIRATION RATE: 18 BRPM | HEART RATE: 84 BPM | TEMPERATURE: 98 F | HEIGHT: 64 IN

## 2025-04-22 DIAGNOSIS — Z98.49 CATARACT EXTRACTION STATUS, UNSPECIFIED EYE: Chronic | ICD-10-CM

## 2025-04-22 DIAGNOSIS — R53.1 WEAKNESS: ICD-10-CM

## 2025-04-22 DIAGNOSIS — M54.12 RADICULOPATHY, CERVICAL REGION: ICD-10-CM

## 2025-04-22 DIAGNOSIS — Z96.641 PRESENCE OF RIGHT ARTIFICIAL HIP JOINT: Chronic | ICD-10-CM

## 2025-04-22 DIAGNOSIS — Z29.9 ENCOUNTER FOR PROPHYLACTIC MEASURES, UNSPECIFIED: ICD-10-CM

## 2025-04-22 DIAGNOSIS — Z98.89 OTHER SPECIFIED POSTPROCEDURAL STATES: Chronic | ICD-10-CM

## 2025-04-22 DIAGNOSIS — M50.20 OTHER CERVICAL DISC DISPLACEMENT, UNSPECIFIED CERVICAL REGION: ICD-10-CM

## 2025-04-22 DIAGNOSIS — Z13.89 ENCOUNTER FOR SCREENING FOR OTHER DISORDER: ICD-10-CM

## 2025-04-22 DIAGNOSIS — Z01.818 ENCOUNTER FOR OTHER PREPROCEDURAL EXAMINATION: ICD-10-CM

## 2025-04-22 LAB
A1C WITH ESTIMATED AVERAGE GLUCOSE RESULT: 6 % — HIGH (ref 4–5.6)
ALBUMIN SERPL ELPH-MCNC: 4.2 G/DL — SIGNIFICANT CHANGE UP (ref 3.3–5.2)
ALP SERPL-CCNC: 59 U/L — SIGNIFICANT CHANGE UP (ref 40–120)
ALT FLD-CCNC: 14 U/L — SIGNIFICANT CHANGE UP
ANION GAP SERPL CALC-SCNC: 11 MMOL/L — SIGNIFICANT CHANGE UP (ref 5–17)
APTT BLD: 31.4 SEC — SIGNIFICANT CHANGE UP (ref 26.1–36.8)
AST SERPL-CCNC: 16 U/L — SIGNIFICANT CHANGE UP
BASOPHILS # BLD AUTO: 0.03 K/UL — SIGNIFICANT CHANGE UP (ref 0–0.2)
BASOPHILS NFR BLD AUTO: 0.6 % — SIGNIFICANT CHANGE UP (ref 0–2)
BILIRUB SERPL-MCNC: 0.2 MG/DL — LOW (ref 0.4–2)
BLD GP AB SCN SERPL QL: SIGNIFICANT CHANGE UP
BUN SERPL-MCNC: 14.3 MG/DL — SIGNIFICANT CHANGE UP (ref 8–20)
CALCIUM SERPL-MCNC: 9.4 MG/DL — SIGNIFICANT CHANGE UP (ref 8.4–10.5)
CHLORIDE SERPL-SCNC: 102 MMOL/L — SIGNIFICANT CHANGE UP (ref 96–108)
CO2 SERPL-SCNC: 28 MMOL/L — SIGNIFICANT CHANGE UP (ref 22–29)
CREAT SERPL-MCNC: 0.83 MG/DL — SIGNIFICANT CHANGE UP (ref 0.5–1.3)
EGFR: 70 ML/MIN/1.73M2 — SIGNIFICANT CHANGE UP
EGFR: 70 ML/MIN/1.73M2 — SIGNIFICANT CHANGE UP
EOSINOPHIL # BLD AUTO: 0.17 K/UL — SIGNIFICANT CHANGE UP (ref 0–0.5)
EOSINOPHIL NFR BLD AUTO: 3.1 % — SIGNIFICANT CHANGE UP (ref 0–6)
ESTIMATED AVERAGE GLUCOSE: 126 MG/DL — HIGH (ref 68–114)
GLUCOSE SERPL-MCNC: 88 MG/DL — SIGNIFICANT CHANGE UP (ref 70–99)
HCT VFR BLD CALC: 41.4 % — SIGNIFICANT CHANGE UP (ref 34.5–45)
HGB BLD-MCNC: 13.7 G/DL — SIGNIFICANT CHANGE UP (ref 11.5–15.5)
IMM GRANULOCYTES # BLD AUTO: 0.04 K/UL — SIGNIFICANT CHANGE UP (ref 0–0.07)
IMM GRANULOCYTES NFR BLD AUTO: 0.7 % — SIGNIFICANT CHANGE UP (ref 0–0.9)
INR BLD: 0.91 RATIO — SIGNIFICANT CHANGE UP (ref 0.85–1.16)
LYMPHOCYTES # BLD AUTO: 1.49 K/UL — SIGNIFICANT CHANGE UP (ref 1–3.3)
LYMPHOCYTES NFR BLD AUTO: 27.4 % — SIGNIFICANT CHANGE UP (ref 13–44)
MCHC RBC-ENTMCNC: 31.1 PG — SIGNIFICANT CHANGE UP (ref 27–34)
MCHC RBC-ENTMCNC: 33.1 G/DL — SIGNIFICANT CHANGE UP (ref 32–36)
MCV RBC AUTO: 94.1 FL — SIGNIFICANT CHANGE UP (ref 80–100)
MONOCYTES # BLD AUTO: 0.72 K/UL — SIGNIFICANT CHANGE UP (ref 0–0.9)
MONOCYTES NFR BLD AUTO: 13.2 % — SIGNIFICANT CHANGE UP (ref 2–14)
MRSA PCR RESULT.: SIGNIFICANT CHANGE UP
NEUTROPHILS # BLD AUTO: 2.99 K/UL — SIGNIFICANT CHANGE UP (ref 1.8–7.4)
NEUTROPHILS NFR BLD AUTO: 55 % — SIGNIFICANT CHANGE UP (ref 43–77)
NRBC # BLD AUTO: 0 K/UL — SIGNIFICANT CHANGE UP (ref 0–0)
NRBC # FLD: 0 K/UL — SIGNIFICANT CHANGE UP (ref 0–0)
NRBC BLD AUTO-RTO: 0 /100 WBCS — SIGNIFICANT CHANGE UP (ref 0–0)
PLATELET # BLD AUTO: 273 K/UL — SIGNIFICANT CHANGE UP (ref 150–400)
PMV BLD: 9.1 FL — SIGNIFICANT CHANGE UP (ref 7–13)
POTASSIUM SERPL-MCNC: 4.8 MMOL/L — SIGNIFICANT CHANGE UP (ref 3.5–5.3)
POTASSIUM SERPL-SCNC: 4.8 MMOL/L — SIGNIFICANT CHANGE UP (ref 3.5–5.3)
PROT SERPL-MCNC: 6.4 G/DL — LOW (ref 6.6–8.7)
PROTHROM AB SERPL-ACNC: 10.3 SEC — SIGNIFICANT CHANGE UP (ref 9.9–13.4)
RBC # BLD: 4.4 M/UL — SIGNIFICANT CHANGE UP (ref 3.8–5.2)
RBC # FLD: 13.2 % — SIGNIFICANT CHANGE UP (ref 10.3–14.5)
S AUREUS DNA NOSE QL NAA+PROBE: DETECTED
SODIUM SERPL-SCNC: 141 MMOL/L — SIGNIFICANT CHANGE UP (ref 135–145)
WBC # BLD: 5.44 K/UL — SIGNIFICANT CHANGE UP (ref 3.8–10.5)
WBC # FLD AUTO: 5.44 K/UL — SIGNIFICANT CHANGE UP (ref 3.8–10.5)

## 2025-04-22 PROCEDURE — 93005 ELECTROCARDIOGRAM TRACING: CPT

## 2025-04-22 PROCEDURE — 85730 THROMBOPLASTIN TIME PARTIAL: CPT

## 2025-04-22 PROCEDURE — 93010 ELECTROCARDIOGRAM REPORT: CPT

## 2025-04-22 PROCEDURE — 85025 COMPLETE CBC W/AUTO DIFF WBC: CPT

## 2025-04-22 PROCEDURE — 86850 RBC ANTIBODY SCREEN: CPT

## 2025-04-22 PROCEDURE — 36415 COLL VENOUS BLD VENIPUNCTURE: CPT

## 2025-04-22 PROCEDURE — 80053 COMPREHEN METABOLIC PANEL: CPT

## 2025-04-22 PROCEDURE — 85610 PROTHROMBIN TIME: CPT

## 2025-04-22 PROCEDURE — 86901 BLOOD TYPING SEROLOGIC RH(D): CPT

## 2025-04-22 PROCEDURE — G0463: CPT

## 2025-04-22 PROCEDURE — 86900 BLOOD TYPING SEROLOGIC ABO: CPT

## 2025-04-22 PROCEDURE — 87641 MR-STAPH DNA AMP PROBE: CPT

## 2025-04-22 PROCEDURE — 87640 STAPH A DNA AMP PROBE: CPT

## 2025-04-22 PROCEDURE — 83036 HEMOGLOBIN GLYCOSYLATED A1C: CPT

## 2025-04-22 RX ORDER — MUPIROCIN CALCIUM 20 MG/G
1 CREAM TOPICAL
Qty: 1 | Refills: 0
Start: 2025-04-22 | End: 2025-04-26

## 2025-04-22 NOTE — H&P PST ADULT - NSHP PST DIAGOTHER LIST_GEN_A_CORE
4/22/2025: mupirocin ordered, patient educated on its use, surgical team notified. Emmanuelle Lujan NP

## 2025-04-22 NOTE — H&P PST ADULT - HISTORY OF PRESENT ILLNESS
85 yo F presents to PST with right shoulder and neck pain that started over 2 weeks ago. She denies any injury. There is pain from her neck to posterior shoulder. She states that she is experiencing radicular pain and numbness down the right arm to her hand. She could not tolerate Celebrex from her PCP due to GI symptoms. Patient is scheduled for C4-C7 Anterior Cervical Discectomy fusion with Dr Mendoza on 4/30/25. 85 yo F presents to PST with right shoulder, right arm pain and weakness. She denies any injury. There is pain from her neck to posterior shoulder. She states that she is experiencing radicular pain and numbness down the right arm to her hand. She is unable to  a cup. Patient is scheduled for C4-C7 Anterior Cervical Discectomy fusion with Dr Mendoza on 4/30/25. 85 yo F presents to PST with right shoulder, right arm pain numbness down her arm to her hand and weakness. She denies any injury, states she is unable to  a cup in her right hand. Patient is scheduled for C4-C7 Anterior Cervical Discectomy fusion with Dr Mendoza on 4/30/25. 83 yo F presents to PST with right shoulder, right arm pain numbness down her arm to her hand and weakness. She denies any injury, Patient states she lost use of her right arm and is unable to adequately  a cup in her right hand.  Patient is scheduled for C4-C7 Anterior Cervical Discectomy fusion with Dr Lindsey on 4/30/25. Pending medical and cardiac optimization.

## 2025-04-22 NOTE — H&P PST ADULT - ASSESSMENT
85 yo F presents to PST with right shoulder and neck pain that started over 2 weeks ago. She denies any injury. There is pain from her neck to posterior shoulder. She states that she is experiencing radicular pain and numbness down the right arm to her hand. She could not tolerate Celebrex from her PCP due to GI symptoms. Patient is scheduled for C4-C7 Anterior Cervical Discectomy fusion with Dr Mendoza on 4/30/25.    OPIOID RISK TOOL    FILOMENA EACH BOX THAT APPLIES AND ADD TOTALS AT THE END    FAMILY HISTORY OF SUBSTANCE ABUSE                 FEMALE         MALE                                                Alcohol                             [  ]1 pt          [  ]3pts                                               Illegal Drugs                     [  ]2 pts        [  ]3pts                                               Rx Drugs                           [  ]4 pts        [  ]4 pts    PERSONAL HISTORY OF SUBSTANCE ABUSE                                                                                          Alcohol                             [  ]3 pts       [  ]3 pts                                               Illegal Drugs                     [  ]4 pts        [  ]4 pts                                               Rx Drugs                           [  ]5 pts        [  ]5 pts    AGE BETWEEN 16-45 YEARS                                      [  ]1 pt         [  ]1 pt    HISTORY OF PREADOLESCENT   SEXUAL ABUSE                                                             [  ]3 pts        [  ]0pts    PSYCHOLOGICAL DISEASE                     ADD, OCD, Bipolar, Schizophrenia        [  ]2 pts         [  ]2 pts                      Depression                                               [  ]1 pt           [  ]1 pt           SCORING TOTAL   (add numbers and type here)              (***)                                     A score of 3 or lower indicated LOW risk for future opioid abuse  A score of 4 to 7 indicated moderate risk for future opioid abuse  A score of 8 or higher indicates a high risk for opioid abuse         85 yo F presents to PST with right shoulder, right arm pain and weakness. She denies any injury. There is pain from her neck to posterior shoulder. She states that she is experiencing radicular pain and numbness down the right arm to her hand. She is unable to  a cup. Patient is scheduled for C4-C7 Anterior Cervical Discectomy fusion with Dr Mendoza on 25.    OPIOID RISK TOOL    FILOMENA EACH BOX THAT APPLIES AND ADD TOTALS AT THE END    FAMILY HISTORY OF SUBSTANCE ABUSE                 FEMALE         MALE                                                Alcohol                             [  ]1 pt          [  ]3pts                                               Illegal Drugs                     [  ]2 pts        [  ]3pts                                               Rx Drugs                           [  ]4 pts        [  ]4 pts    PERSONAL HISTORY OF SUBSTANCE ABUSE                                                                                          Alcohol                             [  ]3 pts       [  ]3 pts                                               Illegal Drugs                     [  ]4 pts        [  ]4 pts                                               Rx Drugs                           [  ]5 pts        [  ]5 pts    AGE BETWEEN 16-45 YEARS                                      [  ]1 pt         [  ]1 pt    HISTORY OF PREADOLESCENT   SEXUAL ABUSE                                                             [  ]3 pts        [  ]0pts    PSYCHOLOGICAL DISEASE                     ADD, OCD, Bipolar, Schizophrenia        [  ]2 pts         [  ]2 pts                      Depression                                               [ x ]1 pt           [  ]1 pt           SCORING TOTAL   (add numbers and type here)              (1)                                     A score of 3 or lower indicated LOW risk for future opioid abuse  A score of 4 to 7 indicated moderate risk for future opioid abuse  A score of 8 or higher indicates a high risk for opioid abuse    CAPRINI SCORE    AGE RELATED RISK FACTORS                                                             [ ] Age 41-60 years                                            (1 Point)  [ ] Age: 61-74 years                                           (2 Points)                 [x ] Age= 75 years                                                (3 Points)             DISEASE RELATED RISK FACTORS                                                       [ ] Edema in the lower extremities                 (1 Point)                     [ ] Varicose veins                                               (1 Point)                                 [ x] BMI > 25 Kg/m2                                            (1 Point)                                  [ ] Serious infection (ie PNA)                            (1 Point)                     [ ] Lung disease ( COPD, Emphysema)            (1 Point)                                                                          [ ] Acute myocardial infarction                         (1 Point)                  [ ] Congestive heart failure (in the previous month)  (1 Point)         [ ] Inflammatory bowel disease                            (1 Point)                  [ ] Central venous access, PICC or Port               (2 points)       (within the last month)                                                                [ ] Stroke (in the previous month)                        (5 Points)    [ ] Previous or present malignancy                       (2 points)                                                                                                                                                         HEMATOLOGY RELATED FACTORS                                                         [ ] Prior episodes of VTE                                     (3 Points)                     [ ] Positive family history for VTE                      (3 Points)                  [ ] Prothrombin 81144 A                                     (3 Points)                     [ ] Factor V Leiden                                                (3 Points)                        [ ] Lupus anticoagulants                                      (3 Points)                                                           [ ] Anticardiolipin antibodies                              (3 Points)                                                       [ ] High homocysteine in the blood                   (3 Points)                                             [ ] Other congenital or acquired thrombophilia      (3 Points)                                                [ ] Heparin induced thrombocytopenia                  (3 Points)                                        MOBILITY RELATED FACTORS  [ ] Bed rest                                                         (1 Point)  [ ] Plaster cast                                                    (2 points)  [ ] Bed bound for more than 72 hours           (2 Points)    GENDER SPECIFIC FACTORS  [ ] Pregnancy or had a baby within the last month   (1 Point)  [ ] Post-partum < 6 weeks                                   (1 Point)  [ ] Hormonal therapy  or oral contraception   (1 Point)  [ ] History of pregnancy complications              (1 point)  [ ] Unexplained or recurrent              (1 Point)    OTHER RISK FACTORS                                           (1 Point)  [ ] BMI >40, smoking, diabetes requiring insulin, chemotherapy  blood transfusions and length of surgery over 2 hours    SURGERY RELATED RISK FACTORS  [ ]  Section within the last month     (1 Point)  [ ] Minor surgery                                                  (1 Point)  [ ] Arthroscopic surgery                                       (2 Points)  [x ] Planned major surgery lasting more            (2 Points)      than 45 minutes     [ ] Elective hip or knee joint replacement       (5 points)       surgery                                                TRAUMA RELATED RISK FACTORS  [ ] Fracture of the hip, pelvis, or leg                       (5 Points)  [ ] Spinal cord injury resulting in paralysis             (5 points)       (in the previous month)    [ ] Paralysis  (less than 1 month)                             (5 Points)  [ ] Multiple Trauma within 1 month                        (5 Points)    Total Score [    6   ]    Caprini Score 0-2: Low Risk, NO VTE prophylaxis required for most patients, encourage ambulation  Caprini Score 3-6: Moderate Risk , pharmacologic VTE prophylaxis is indicated for most patients (in the absence of contraindications)  Caprini Score Greater than or =7: High risk, pharmocologic VTE prophylaxis indicated for most patients (in the absence of contraindications)                                       85 yo F presents to PST with right shoulder, right arm pain numbness down her arm to her hand and weakness. She denies any injury, Patient states she lost use of her right arm and is unable to adequately  a cup in her right hand.  Patient is scheduled for C4-C7 Anterior Cervical Discectomy fusion with Dr Lindsey on 25. Pending medical and cardiac optimization.    Spine booklet provided, ERP protocol reviewed, MSSA/MRSA swab done in pst, results pending.     Patient was educated on preop preparation with written and verbal instructions. Pt was informed to obtain clearances >3 days before surgery. Pt was educated on NSAIDs, multivitamins and herbals that increase the risk of bleeding and need to be stopped 7 days before procedure. Pt verbalized understanding of the above.     OPIOID RISK TOOL    FILOMENA EACH BOX THAT APPLIES AND ADD TOTALS AT THE END    FAMILY HISTORY OF SUBSTANCE ABUSE                 FEMALE         MALE                                                Alcohol                             [  ]1 pt          [  ]3pts                                               Illegal Drugs                     [  ]2 pts        [  ]3pts                                               Rx Drugs                           [  ]4 pts        [  ]4 pts    PERSONAL HISTORY OF SUBSTANCE ABUSE                                                                                          Alcohol                             [  ]3 pts       [  ]3 pts                                               Illegal Drugs                     [  ]4 pts        [  ]4 pts                                               Rx Drugs                           [  ]5 pts        [  ]5 pts    AGE BETWEEN 16-45 YEARS                                      [  ]1 pt         [  ]1 pt    HISTORY OF PREADOLESCENT   SEXUAL ABUSE                                                             [  ]3 pts        [  ]0pts    PSYCHOLOGICAL DISEASE                     ADD, OCD, Bipolar, Schizophrenia        [  ]2 pts         [  ]2 pts                      Depression                                               [ x ]1 pt           [  ]1 pt           SCORING TOTAL   (add numbers and type here)              (1)                                     A score of 3 or lower indicated LOW risk for future opioid abuse  A score of 4 to 7 indicated moderate risk for future opioid abuse  A score of 8 or higher indicates a high risk for opioid abuse    CAPRINI SCORE    AGE RELATED RISK FACTORS                                                             [ ] Age 41-60 years                                            (1 Point)  [ ] Age: 61-74 years                                           (2 Points)                 [x ] Age= 75 years                                                (3 Points)             DISEASE RELATED RISK FACTORS                                                       [ ] Edema in the lower extremities                 (1 Point)                     [ x] Varicose veins                                               (1 Point)                                 [ x] BMI > 25 Kg/m2                                            (1 Point)                                  [ ] Serious infection (ie PNA)                            (1 Point)                     [ ] Lung disease ( COPD, Emphysema)            (1 Point)                                                                          [ ] Acute myocardial infarction                         (1 Point)                  [ ] Congestive heart failure (in the previous month)  (1 Point)         [ ] Inflammatory bowel disease                            (1 Point)                  [ ] Central venous access, PICC or Port               (2 points)       (within the last month)                                                                [ ] Stroke (in the previous month)                        (5 Points)    [ ] Previous or present malignancy                       (2 points)                                                                                                                                                         HEMATOLOGY RELATED FACTORS                                                         [ ] Prior episodes of VTE                                     (3 Points)                     [ ] Positive family history for VTE                      (3 Points)                  [ ] Prothrombin 18532 A                                     (3 Points)                     [ ] Factor V Leiden                                                (3 Points)                        [ ] Lupus anticoagulants                                      (3 Points)                                                           [ ] Anticardiolipin antibodies                              (3 Points)                                                       [ ] High homocysteine in the blood                   (3 Points)                                             [ ] Other congenital or acquired thrombophilia      (3 Points)                                                [ ] Heparin induced thrombocytopenia                  (3 Points)                                        MOBILITY RELATED FACTORS  [ ] Bed rest                                                         (1 Point)  [ ] Plaster cast                                                    (2 points)  [ ] Bed bound for more than 72 hours           (2 Points)    GENDER SPECIFIC FACTORS  [ ] Pregnancy or had a baby within the last month   (1 Point)  [ ] Post-partum < 6 weeks                                   (1 Point)  [ ] Hormonal therapy  or oral contraception   (1 Point)  [ ] History of pregnancy complications              (1 point)  [ ] Unexplained or recurrent              (1 Point)    OTHER RISK FACTORS                                           (1 Point)  [ ] BMI >40, smoking, diabetes requiring insulin, chemotherapy  blood transfusions and length of surgery over 2 hours    SURGERY RELATED RISK FACTORS  [ ]  Section within the last month     (1 Point)  [ ] Minor surgery                                                  (1 Point)  [ ] Arthroscopic surgery                                       (2 Points)  [x ] Planned major surgery lasting more            (2 Points)      than 45 minutes     [ ] Elective hip or knee joint replacement       (5 points)       surgery                                                TRAUMA RELATED RISK FACTORS  [ ] Fracture of the hip, pelvis, or leg                       (5 Points)  [ ] Spinal cord injury resulting in paralysis             (5 points)       (in the previous month)    [ ] Paralysis  (less than 1 month)                             (5 Points)  [ ] Multiple Trauma within 1 month                        (5 Points)    Total Score [    7  ]    Caprini Score 0-2: Low Risk, NO VTE prophylaxis required for most patients, encourage ambulation  Caprini Score 3-6: Moderate Risk , pharmacologic VTE prophylaxis is indicated for most patients (in the absence of contraindications)  Caprini Score Greater than or =7: High risk, pharmocologic VTE prophylaxis indicated for most patients (in the absence of contraindications)

## 2025-04-22 NOTE — H&P PST ADULT - PROBLEM SELECTOR PLAN 5
caprini score of 6,moderate risk,  scd ordered, caprini score 7, high risk for dvt, SCD ordered, surgical team to assess for dvt prophylaxis

## 2025-04-22 NOTE — H&P PST ADULT - WEIGHT IN KG
Patient is doing intermittent fasting, currently eating between noon and 6 PM.  States that she is doing well so far. Will check labs and will see again in 3 months. 68.9

## 2025-04-22 NOTE — H&P PST ADULT - NEGATIVE CARDIOVASCULAR SYMPTOMS
no chest pain/no palpitations no chest pain/no palpitations/no dyspnea on exertion no chest pain/no palpitations/no dyspnea on exertion/no peripheral edema

## 2025-04-22 NOTE — PROVIDER CONTACT NOTE (OTHER) - ACTION/TREATMENT ORDERED:
Emailed video of preop spine class to pt on 4/18, followed with Telephone review of program, all questions answered. Contact information given

## 2025-04-22 NOTE — H&P PST ADULT - NSICDXPASTMEDICALHX_GEN_ALL_CORE_FT
PAST MEDICAL HISTORY:  GERD (gastroesophageal reflux disease)     Other cervical disc displacement, unspecified cervical region     RA (rheumatoid arthritis)     Radiculopathy, cervical region     Weakness      PAST MEDICAL HISTORY:  Abnormal EKG     GERD (gastroesophageal reflux disease)     Other cervical disc displacement, unspecified cervical region     Psoriasis     RA (rheumatoid arthritis)     Radiculopathy, cervical region     Weakness

## 2025-04-22 NOTE — H&P PST ADULT - MUSCULOSKELETAL
details… normal gait/decreased strength no joint swelling/normal gait/decreased strength right arm/no joint swelling/no calf tenderness/decreased ROM due to pain/normal gait/strength 5/5 bilateral lower extremities/decreased strength

## 2025-04-22 NOTE — H&P PST ADULT - NSICDXPASTSURGICALHX_GEN_ALL_CORE_FT
PAST SURGICAL HISTORY:  H/O varicose vein ligation 1974    No significant past surgical history      PAST SURGICAL HISTORY:  H/O varicose vein ligation 1974    History of cataract surgery     History of right hip replacement

## 2025-04-22 NOTE — H&P PST ADULT - PROBLEM SELECTOR PLAN 1
C4-C7 Anterior Cervical Discectomy fusion with Dr Mendoza on 4/30/25. Preop assessment scheduled for C4-C7 Anterior Cervical Discectomy fusion with Dr Mendoza on 4/30/25. Pending medical and cardiac optimization. Preop assessment scheduled for C4-C7 Anterior Cervical Discectomy fusion with Dr Lindsey on 4/30/25. Pending medical and cardiac optimization.

## 2025-04-22 NOTE — H&P PST ADULT - PROBLEM SELECTOR PLAN 3
Preop assessment scheduled for C4-C7 Anterior Cervical Discectomy fusion with Dr Mendoza on 4/30/25. Pending medical and cardiac optimization. Preop assessment scheduled for C4-C7 Anterior Cervical Discectomy fusion with Dr Lindsey on 4/30/25. Pending medical and cardiac optimization.

## 2025-04-29 RX ORDER — POVIDONE-IODINE 7.5 %
1 SOLUTION, NON-ORAL TOPICAL ONCE
Refills: 0 | Status: COMPLETED | OUTPATIENT
Start: 2025-04-30 | End: 2025-04-30

## 2025-04-29 NOTE — PATIENT PROFILE ADULT - FALL HARM RISK - HARM RISK INTERVENTIONS
Assistance with ambulation/Assistance OOB with selected safe patient handling equipment/Communicate Risk of Fall with Harm to all staff/Discuss with provider need for PT consult/Monitor gait and stability/Provide patient with walking aids - walker, cane, crutches/Reinforce activity limits and safety measures with patient and family/Sit up slowly, dangle for a short time, stand at bedside before walking/Tailored Fall Risk Interventions/Use of alarms - bed, chair and/or voice tab/Visual Cue: Yellow wristband and red socks/Bed in lowest position, wheels locked, appropriate side rails in place/Call bell, personal items and telephone in reach/Instruct patient to call for assistance before getting out of bed or chair/Non-slip footwear when patient is out of bed/[place] to call system/Physically safe environment - no spills, clutter or unnecessary equipment/Purposeful Proactive Rounding/Room/bathroom lighting operational, light cord in reach

## 2025-04-29 NOTE — PATIENT PROFILE ADULT - ABILITY TO HEAR (WITH HEARING AID OR HEARING APPLIANCE IF NORMALLY USED):
pt states she has mild hearing loss. has hearing aids at home but "they don't work"/Adequate: hears normal conversation without difficulty

## 2025-04-30 ENCOUNTER — TRANSCRIPTION ENCOUNTER (OUTPATIENT)
Age: 85
End: 2025-04-30

## 2025-04-30 ENCOUNTER — APPOINTMENT (OUTPATIENT)
Dept: PAIN MANAGEMENT | Facility: CLINIC | Age: 85
End: 2025-04-30

## 2025-04-30 ENCOUNTER — INPATIENT (INPATIENT)
Facility: HOSPITAL | Age: 85
LOS: 1 days | Discharge: HOME | DRG: 552 | End: 2025-05-02
Attending: NEUROLOGICAL SURGERY | Admitting: NEUROLOGICAL SURGERY
Payer: MEDICARE

## 2025-04-30 VITALS
HEART RATE: 70 BPM | TEMPERATURE: 97 F | HEIGHT: 64 IN | RESPIRATION RATE: 16 BRPM | DIASTOLIC BLOOD PRESSURE: 74 MMHG | WEIGHT: 151.9 LBS | SYSTOLIC BLOOD PRESSURE: 119 MMHG | OXYGEN SATURATION: 99 %

## 2025-04-30 DIAGNOSIS — Z98.49 CATARACT EXTRACTION STATUS, UNSPECIFIED EYE: Chronic | ICD-10-CM

## 2025-04-30 DIAGNOSIS — M50.20 OTHER CERVICAL DISC DISPLACEMENT, UNSPECIFIED CERVICAL REGION: ICD-10-CM

## 2025-04-30 DIAGNOSIS — Z98.89 OTHER SPECIFIED POSTPROCEDURAL STATES: Chronic | ICD-10-CM

## 2025-04-30 DIAGNOSIS — Z96.641 PRESENCE OF RIGHT ARTIFICIAL HIP JOINT: Chronic | ICD-10-CM

## 2025-04-30 LAB — GLUCOSE BLDC GLUCOMTR-MCNC: 118 MG/DL — HIGH (ref 70–99)

## 2025-04-30 DEVICE — PIN TEMP FIXATION: Type: IMPLANTABLE DEVICE | Status: FUNCTIONAL

## 2025-04-30 DEVICE — SCREW OZARK SELF START 4.35X14MM: Type: IMPLANTABLE DEVICE | Status: FUNCTIONAL

## 2025-04-30 DEVICE — SURGIFOAM 8 X 12.5CM X 10MM (100): Type: IMPLANTABLE DEVICE | Status: FUNCTIONAL

## 2025-04-30 DEVICE — PLATE 3 LVL 54MM: Type: IMPLANTABLE DEVICE | Status: FUNCTIONAL

## 2025-04-30 DEVICE — SPACER CASCADIA 12 DEG 7X13X16MM: Type: IMPLANTABLE DEVICE | Status: FUNCTIONAL

## 2025-04-30 DEVICE — DISTRACTION PIN 12MM (BLUE): Type: IMPLANTABLE DEVICE | Status: FUNCTIONAL

## 2025-04-30 DEVICE — IMPLANTABLE DEVICE: Type: IMPLANTABLE DEVICE | Status: FUNCTIONAL

## 2025-04-30 DEVICE — SURGIFLO MATRIX WITH THROMBIN KIT: Type: IMPLANTABLE DEVICE | Status: FUNCTIONAL

## 2025-04-30 DEVICE — GRAFT BONE ACTIFUSE SHAPE CYLINDER 2.6 ML MED: Type: IMPLANTABLE DEVICE | Status: FUNCTIONAL

## 2025-04-30 RX ORDER — OXYCODONE HYDROCHLORIDE 30 MG/1
10 TABLET ORAL EVERY 6 HOURS
Refills: 0 | Status: DISCONTINUED | OUTPATIENT
Start: 2025-04-30 | End: 2025-04-30

## 2025-04-30 RX ORDER — HYDROXYZINE HYDROCHLORIDE 25 MG/1
10 TABLET, FILM COATED ORAL AT BEDTIME
Refills: 0 | Status: DISCONTINUED | OUTPATIENT
Start: 2025-04-30 | End: 2025-04-30

## 2025-04-30 RX ORDER — ACETAMINOPHEN 500 MG/5ML
650 LIQUID (ML) ORAL EVERY 6 HOURS
Refills: 0 | Status: DISCONTINUED | OUTPATIENT
Start: 2025-04-30 | End: 2025-04-30

## 2025-04-30 RX ORDER — SERTRALINE 100 MG/1
25 TABLET, FILM COATED ORAL DAILY
Refills: 0 | Status: DISCONTINUED | OUTPATIENT
Start: 2025-04-30 | End: 2025-04-30

## 2025-04-30 RX ORDER — OXYCODONE HYDROCHLORIDE 30 MG/1
5 TABLET ORAL EVERY 6 HOURS
Refills: 0 | Status: DISCONTINUED | OUTPATIENT
Start: 2025-04-30 | End: 2025-05-02

## 2025-04-30 RX ORDER — POLYETHYLENE GLYCOL 3350 17 G/17G
17 POWDER, FOR SOLUTION ORAL EVERY 12 HOURS
Refills: 0 | Status: DISCONTINUED | OUTPATIENT
Start: 2025-04-30 | End: 2025-05-02

## 2025-04-30 RX ORDER — HYDROMORPHONE/SOD CHLOR,ISO/PF 2 MG/10 ML
0.5 SYRINGE (ML) INJECTION
Refills: 0 | Status: DISCONTINUED | OUTPATIENT
Start: 2025-04-30 | End: 2025-04-30

## 2025-04-30 RX ORDER — METHOCARBAMOL 500 MG/1
500 TABLET, FILM COATED ORAL EVERY 8 HOURS
Refills: 0 | Status: DISCONTINUED | OUTPATIENT
Start: 2025-04-30 | End: 2025-04-30

## 2025-04-30 RX ORDER — CEFAZOLIN SODIUM IN 0.9 % NACL 3 G/100 ML
2000 INTRAVENOUS SOLUTION, PIGGYBACK (ML) INTRAVENOUS ONCE
Refills: 0 | Status: DISCONTINUED | OUTPATIENT
Start: 2025-04-30 | End: 2025-04-30

## 2025-04-30 RX ORDER — METHOCARBAMOL 500 MG/1
750 TABLET, FILM COATED ORAL EVERY 8 HOURS
Refills: 0 | Status: DISCONTINUED | OUTPATIENT
Start: 2025-04-30 | End: 2025-05-02

## 2025-04-30 RX ORDER — TRAMADOL HYDROCHLORIDE 50 MG/1
50 TABLET, FILM COATED ORAL EVERY 6 HOURS
Refills: 0 | Status: DISCONTINUED | OUTPATIENT
Start: 2025-04-30 | End: 2025-05-02

## 2025-04-30 RX ORDER — SODIUM CHLORIDE 9 G/1000ML
1000 INJECTION, SOLUTION INTRAVENOUS
Refills: 0 | Status: DISCONTINUED | OUTPATIENT
Start: 2025-04-30 | End: 2025-04-30

## 2025-04-30 RX ORDER — ONDANSETRON HCL/PF 4 MG/2 ML
4 VIAL (ML) INJECTION ONCE
Refills: 0 | Status: DISCONTINUED | OUTPATIENT
Start: 2025-04-30 | End: 2025-04-30

## 2025-04-30 RX ORDER — DEXAMETHASONE 0.5 MG/1
4 TABLET ORAL EVERY 6 HOURS
Refills: 0 | Status: DISCONTINUED | OUTPATIENT
Start: 2025-04-30 | End: 2025-04-30

## 2025-04-30 RX ORDER — B1/B2/B3/B5/B6/B12/VIT C/FOLIC 500-0.5 MG
1 TABLET ORAL DAILY
Refills: 0 | Status: DISCONTINUED | OUTPATIENT
Start: 2025-04-30 | End: 2025-04-30

## 2025-04-30 RX ORDER — DEXAMETHASONE 0.5 MG/1
4 TABLET ORAL EVERY 6 HOURS
Refills: 0 | Status: COMPLETED | OUTPATIENT
Start: 2025-04-30 | End: 2025-05-01

## 2025-04-30 RX ORDER — VANCOMYCIN HCL IN 5 % DEXTROSE 1.5G/250ML
1000 PLASTIC BAG, INJECTION (ML) INTRAVENOUS ONCE
Refills: 0 | Status: COMPLETED | OUTPATIENT
Start: 2025-05-01 | End: 2025-05-01

## 2025-04-30 RX ORDER — SENNA 187 MG
2 TABLET ORAL AT BEDTIME
Refills: 0 | Status: DISCONTINUED | OUTPATIENT
Start: 2025-04-30 | End: 2025-05-02

## 2025-04-30 RX ORDER — SENNA 187 MG
2 TABLET ORAL AT BEDTIME
Refills: 0 | Status: DISCONTINUED | OUTPATIENT
Start: 2025-04-30 | End: 2025-04-30

## 2025-04-30 RX ORDER — FENTANYL CITRATE-0.9 % NACL/PF 100MCG/2ML
25 SYRINGE (ML) INTRAVENOUS
Refills: 0 | Status: DISCONTINUED | OUTPATIENT
Start: 2025-04-30 | End: 2025-04-30

## 2025-04-30 RX ORDER — METHOCARBAMOL 500 MG/1
1 TABLET, FILM COATED ORAL
Refills: 0 | DISCHARGE

## 2025-04-30 RX ORDER — ACETAMINOPHEN 500 MG/5ML
975 LIQUID (ML) ORAL ONCE
Refills: 0 | Status: COMPLETED | OUTPATIENT
Start: 2025-04-30 | End: 2025-04-30

## 2025-04-30 RX ORDER — POLYETHYLENE GLYCOL 3350 17 G/17G
17 POWDER, FOR SOLUTION ORAL DAILY
Refills: 0 | Status: DISCONTINUED | OUTPATIENT
Start: 2025-04-30 | End: 2025-04-30

## 2025-04-30 RX ORDER — OXYCODONE HYDROCHLORIDE 30 MG/1
5 TABLET ORAL EVERY 6 HOURS
Refills: 0 | Status: DISCONTINUED | OUTPATIENT
Start: 2025-04-30 | End: 2025-04-30

## 2025-04-30 RX ADMIN — Medication 0.5 MILLIGRAM(S): at 12:45

## 2025-04-30 RX ADMIN — TRAMADOL HYDROCHLORIDE 50 MILLIGRAM(S): 50 TABLET, FILM COATED ORAL at 20:36

## 2025-04-30 RX ADMIN — POLYETHYLENE GLYCOL 3350 17 GRAM(S): 17 POWDER, FOR SOLUTION ORAL at 18:34

## 2025-04-30 RX ADMIN — TRAMADOL HYDROCHLORIDE 50 MILLIGRAM(S): 50 TABLET, FILM COATED ORAL at 21:36

## 2025-04-30 RX ADMIN — Medication 1 LOZENGE: at 15:30

## 2025-04-30 RX ADMIN — METHOCARBAMOL 750 MILLIGRAM(S): 500 TABLET, FILM COATED ORAL at 20:40

## 2025-04-30 RX ADMIN — Medication 1 LOZENGE: at 17:23

## 2025-04-30 RX ADMIN — Medication 3 MILLILITER(S): at 22:26

## 2025-04-30 RX ADMIN — Medication 2 TABLET(S): at 20:40

## 2025-04-30 RX ADMIN — OXYCODONE HYDROCHLORIDE 5 MILLIGRAM(S): 30 TABLET ORAL at 15:30

## 2025-04-30 RX ADMIN — Medication 0.5 MILLIGRAM(S): at 11:52

## 2025-04-30 RX ADMIN — Medication 1 LOZENGE: at 20:40

## 2025-04-30 RX ADMIN — Medication 1 APPLICATION(S): at 06:44

## 2025-04-30 RX ADMIN — DEXAMETHASONE 4 MILLIGRAM(S): 0.5 TABLET ORAL at 17:24

## 2025-04-30 RX ADMIN — Medication 3 MILLILITER(S): at 15:00

## 2025-04-30 RX ADMIN — Medication 975 MILLIGRAM(S): at 07:03

## 2025-04-30 RX ADMIN — OXYCODONE HYDROCHLORIDE 5 MILLIGRAM(S): 30 TABLET ORAL at 16:30

## 2025-04-30 NOTE — BRIEF OPERATIVE NOTE - FIRST ASSIST PARTICIPATION DETAILS - (COMPONENTS OF THE PROCEDURE THAT ASSISTANT PARTICIPATED IN)
Breath sounds clear and equal bilaterally. I acted within this role throughout the entirety of the procedure performed by the primary surgeon Breath sounds equal bilaterally. Bilateral rhonchi

## 2025-04-30 NOTE — PROGRESS NOTE ADULT - SUBJECTIVE AND OBJECTIVE BOX
POST-OPERATIVE NOTE    Procedure: C4-C7 ACDF    Diagnosis/Indication: Neural foraminal stenosis of cervical spine     Surgeon: Dr Lindsey    INTERVAL HPI/ACUTE EVENTS:  84yFemale PMH admitted with now s/p C4-C7 ACDF POD#0. Patient seen lying comfortably in bed. Denies CP, SOB, YEAGER, calf tenderness. Pain controlled with medication.    VITALS:  T(C): 36.4 (04-30-25 @ 10:51), Max: 36.4 (04-30-25 @ 10:51)  HR: 66 (04-30-25 @ 11:10) (66 - 76)  BP: 142/86 (04-30-25 @ 11:45) (119/74 - 150/105)  RR: 20 (04-30-25 @ 11:45) (12 - 20)  SpO2: 110% (04-30-25 @ 11:45) (99% - 110%)  Wt(kg): --    PHYSICAL EXAM:  GENERAL: NAD  DRAINS: Subfascial drain to bulb suction  NECK: C-collar in place. Dressing clean, dry, intact  WOUND: Dressing clean dry intact  MENTAL STATUS: AAO x3; Opens eyes spontaneously; Appropriately conversant without aphasia; following simple commands  CRANIAL NERVES: PERRL. EOMI without nystagmus. Facial sensation intact V1-3 distribution b/l. Face symmetric w/ normal eye closure and smile, REFLEXES: PERRL. Corneals intact b/l. Gag intact. Cough intact. Oculocephalic reflex intact (Doll's eye). Negative Levy's b/l. Negative clonus b/l  MOTOR: strength 5/5 b/l upper and lower extremities  Uppers     Delt (C5/6)     Bicep (C5/6)     Wrist Extend (C6)     Tricep (C7)     HG (C8/T1)  R                     5/5                 5/5                         5/5                           5/5                   5/5  L                      5/5                 5/5                         5/5                           5/5                   5/5  Lowers      HF(L1/L2)     KE (L3)     DF (L4)     EHL (L5)     PF (S1)      R                     5/5              5/5           5/5           5/5            5/5  L                     5/5               5/5          5/5            5/5            5/5  SENSATION: grossly intact to light touch all extremities  COORDINATION: Gait intact; rapid alternating movements intact; heel to shin intact; no upper extremity dysmetria  CHEST/LUNG: Clear to auscultation bilaterally; no rales, rhonchi, wheezing, or rubs  HEART: +S1/+S2; Regular rate and rhythm; no murmurs, rubs, or gallops  ABDOMEN: Soft, nontender, nondistended; bowel sounds present all four quadrants  EXTREMITIES:  2+ peripheral pulses, no clubbing, cyanosis, or edema  SKIN: Warm, dry; no rashes or lesions    LABS:            RADIOLOGY/OTHER: POST-OPERATIVE NOTE    Procedure: C4-C7 ACDF    Diagnosis/Indication: Neural foraminal stenosis of cervical spine     Surgeon: Dr Lindsey    INTERVAL HPI/ACUTE EVENTS:  84yFemale PMH admitted with now s/p C4-C7 ACDF POD#0. Patient seen lying comfortably in bed. Denies CP, SOB, YEAGER, calf tenderness. Pain controlled with medication.    VITALS:  T(C): 36.4 (04-30-25 @ 10:51), Max: 36.4 (04-30-25 @ 10:51)  HR: 66 (04-30-25 @ 11:10) (66 - 76)  BP: 142/86 (04-30-25 @ 11:45) (119/74 - 150/105)  RR: 20 (04-30-25 @ 11:45) (12 - 20)  SpO2: 110% (04-30-25 @ 11:45) (99% - 110%)  Wt(kg): --    PHYSICAL EXAM:  GENERAL: NAD  DRAINS: Subfascial drain to bulb suction  NECK: C-collar in place. Dressing clean, dry, intact  WOUND: Dressing clean dry intact  MENTAL STATUS: AAO x3; Opens eyes spontaneously; Appropriately conversant without aphasia; following simple commands  CRANIAL NERVES: PERRL. EOMI without nystagmus. Facial sensation intact V1-3 distribution b/l. Face symmetric w/ normal eye closure and smile, REFLEXES: PERRL. Corneals intact b/l. Gag intact. Cough intact. Oculocephalic reflex intact (Doll's eye). Negative Levy's b/l. Negative clonus b/l  MOTOR: Improved decreased sensation on RUE  Uppers     Delt (C5/6)     Bicep (C5/6)        Tricep (C7)     HG (C8/T1)  R                     4-/5                 4/5              4/5                   5/5  L                      5/5                 5/5              5/5                   5/5  Lowers      HF(L1/L2)     KE (L3)     DF (L4)     EHL (L5)     PF (S1)      R                     5/5              5/5           5/5           5/5            5/5  L                     5/5               5/5          5/5            5/5            5/5  CHEST/LUNG: Clear to auscultation bilaterally; no rales, rhonchi, wheezing, or rubs  HEART: +S1/+S2; Regular rate and rhythm; no murmurs, rubs, or gallops  ABDOMEN: Soft, nontender, nondistended; bowel sounds present all four quadrants  EXTREMITIES:  2+ peripheral pulses, no clubbing, cyanosis, or edema  SKIN: Warm, dry; no rashes or lesions    RADIOLOGY/OTHER: POST-OPERATIVE NOTE    Procedure: C4-C7 ACDF    Diagnosis/Indication: Neural foraminal stenosis of cervical spine     Surgeon: Dr Lindsey    INTERVAL HPI/ACUTE EVENTS:  84y Female PMH GERD, Psoriasis, RA, admitted with weakness and numbness of right arm now s/p C4-C7 ACDF. Patient seen lying comfortably in bed. Denies CP, SOB, YEAGER, calf tenderness. Pain controlled with medication.    VITALS:  T(C): 36.4 (04-30-25 @ 10:51), Max: 36.4 (04-30-25 @ 10:51)  HR: 66 (04-30-25 @ 11:10) (66 - 76)  BP: 142/86 (04-30-25 @ 11:45) (119/74 - 150/105)  RR: 20 (04-30-25 @ 11:45) (12 - 20)  SpO2: 110% (04-30-25 @ 11:45) (99% - 110%)  Wt(kg): --    PHYSICAL EXAM:  GENERAL: NAD  DRAINS: 1 Subfascial drain to bulb suction  NECK: C-collar in place. Dressing clean, dry, intact  WOUND: Dressing clean dry intact  MENTAL STATUS: AAO x3; Opens eyes spontaneously; Appropriately conversant without aphasia; following simple commands  CRANIAL NERVES: PERRL. EOMI without nystagmus. Facial sensation intact V1-3 distribution b/l. Face symmetric w/ normal eye closure and smile  MOTOR: Improved decreased sensation on RUE  Uppers     Delt (C5/6)     Bicep (C5/6)        Tricep (C7)     HG (C8/T1)  R                     4-/5                 4/5              4/5                   5/5  L                      5/5                 5/5              5/5                   5/5  Lowers      HF(L1/L2)     KE (L3)     DF (L4)          PF (S1)      R                     5/5              5/5           5/5           5/5              L                     5/5               5/5          5/5            5/5              CHEST/LUNG: Clear to auscultation bilaterally; no rales, rhonchi, wheezing, or rubs  HEART: +S1/+S2; Regular rate and rhythm; no murmurs, rubs, or gallops  ABDOMEN: Soft, nontender, nondistended; bowel sounds present all four quadrants  EXTREMITIES:  2+ peripheral pulses, no clubbing, cyanosis, or edema  SKIN: Warm, dry; no rashes or lesions    RADIOLOGY/OTHER:

## 2025-04-30 NOTE — CHART NOTE - NSCHARTNOTEFT_GEN_A_CORE
Patient was fit and delivered a cervical collar occipital mandibular support with additional soft sleeve protection. The collar will stabilize and control the cervical spine, reduce ROM and safely protect the surgery site. Care use and function were explained. Contact info was provided. All went without incident.   [facility]  [phone]

## 2025-04-30 NOTE — PROGRESS NOTE ADULT - ASSESSMENT
ASSESSMENT/PLAN:  84yFemale PMH admitted with now s/p C4-C7 ACDF.   POD#0.    - q4 NC/VC   - Periop abx (Vanco 1g until drains removed due to anaphylaxis to penicillin)  - 1 SF KIRAN Drain  - No imaging required at this time  - C-collar at all times   - Pain control   - ADAT   - GI/DVT ppx (can start SQL 24 hours post op)   - OOB as tolerated  - PT/OT pending   ASSESSMENT/PLAN:  84y Female PMH GERD, Psoriasis, RA, admitted with weakness and numbness of right arm now s/p C4-C7 ACDF.   POD#0.    - q4 NC/VC   - Periop abx (Vanco 1g until drains removed due to anaphylaxis to penicillin)  - 1 SF KIRAN Drain  - No imaging required at this time  - C-collar at all times   - Pain control: Oxy5mg/Tramadol 50mg, Tylenol  - Ok to use NSAIDs if needed, alternating use with Tylenol  - ADAT   - GI/DVT ppx (can start SQL 24 hours post op)   - OOB as tolerated  - PT/OT pending

## 2025-05-01 ENCOUNTER — TRANSCRIPTION ENCOUNTER (OUTPATIENT)
Age: 85
End: 2025-05-01

## 2025-05-01 RX ORDER — HYDROXYZINE HYDROCHLORIDE 25 MG/1
10 TABLET, FILM COATED ORAL DAILY
Refills: 0 | Status: DISCONTINUED | OUTPATIENT
Start: 2025-05-02 | End: 2025-05-02

## 2025-05-01 RX ORDER — VANCOMYCIN HCL IN 5 % DEXTROSE 1.5G/250ML
1000 PLASTIC BAG, INJECTION (ML) INTRAVENOUS ONCE
Refills: 0 | Status: DISCONTINUED | OUTPATIENT
Start: 2025-05-02 | End: 2025-05-02

## 2025-05-01 RX ORDER — ENOXAPARIN SODIUM 100 MG/ML
40 INJECTION SUBCUTANEOUS EVERY 24 HOURS
Refills: 0 | Status: DISCONTINUED | OUTPATIENT
Start: 2025-05-01 | End: 2025-05-02

## 2025-05-01 RX ORDER — SERTRALINE 100 MG/1
25 TABLET, FILM COATED ORAL DAILY
Refills: 0 | Status: DISCONTINUED | OUTPATIENT
Start: 2025-05-01 | End: 2025-05-02

## 2025-05-01 RX ADMIN — Medication 1 LOZENGE: at 23:44

## 2025-05-01 RX ADMIN — POLYETHYLENE GLYCOL 3350 17 GRAM(S): 17 POWDER, FOR SOLUTION ORAL at 06:09

## 2025-05-01 RX ADMIN — DEXAMETHASONE 4 MILLIGRAM(S): 0.5 TABLET ORAL at 06:09

## 2025-05-01 RX ADMIN — Medication 1 LOZENGE: at 05:20

## 2025-05-01 RX ADMIN — Medication 40 MILLIGRAM(S): at 06:09

## 2025-05-01 RX ADMIN — Medication 3 MILLILITER(S): at 13:17

## 2025-05-01 RX ADMIN — Medication 20 MILLIGRAM(S): at 23:52

## 2025-05-01 RX ADMIN — POLYETHYLENE GLYCOL 3350 17 GRAM(S): 17 POWDER, FOR SOLUTION ORAL at 17:23

## 2025-05-01 RX ADMIN — Medication 3 MILLILITER(S): at 05:10

## 2025-05-01 RX ADMIN — DEXAMETHASONE 4 MILLIGRAM(S): 0.5 TABLET ORAL at 01:05

## 2025-05-01 RX ADMIN — Medication 2 TABLET(S): at 23:44

## 2025-05-01 RX ADMIN — OXYCODONE HYDROCHLORIDE 5 MILLIGRAM(S): 30 TABLET ORAL at 05:21

## 2025-05-01 RX ADMIN — SERTRALINE 25 MILLIGRAM(S): 100 TABLET, FILM COATED ORAL at 23:45

## 2025-05-01 RX ADMIN — TRAMADOL HYDROCHLORIDE 50 MILLIGRAM(S): 50 TABLET, FILM COATED ORAL at 19:49

## 2025-05-01 RX ADMIN — METHOCARBAMOL 750 MILLIGRAM(S): 500 TABLET, FILM COATED ORAL at 13:03

## 2025-05-01 RX ADMIN — Medication 3 MILLILITER(S): at 23:45

## 2025-05-01 RX ADMIN — TRAMADOL HYDROCHLORIDE 50 MILLIGRAM(S): 50 TABLET, FILM COATED ORAL at 20:40

## 2025-05-01 RX ADMIN — DEXAMETHASONE 4 MILLIGRAM(S): 0.5 TABLET ORAL at 13:03

## 2025-05-01 RX ADMIN — METHOCARBAMOL 750 MILLIGRAM(S): 500 TABLET, FILM COATED ORAL at 23:44

## 2025-05-01 RX ADMIN — Medication 250 MILLIGRAM(S): at 08:24

## 2025-05-01 RX ADMIN — METHOCARBAMOL 750 MILLIGRAM(S): 500 TABLET, FILM COATED ORAL at 05:19

## 2025-05-01 RX ADMIN — Medication 1 LOZENGE: at 08:24

## 2025-05-01 RX ADMIN — Medication 1 APPLICATION(S): at 13:03

## 2025-05-01 RX ADMIN — ENOXAPARIN SODIUM 40 MILLIGRAM(S): 100 INJECTION SUBCUTANEOUS at 23:45

## 2025-05-01 NOTE — DISCHARGE NOTE PROVIDER - NSDCCPCAREPLAN_GEN_ALL_CORE_FT
PRINCIPAL DISCHARGE DIAGNOSIS  Diagnosis: Neural foraminal stenosis of cervical spine  Assessment and Plan of Treatment:

## 2025-05-01 NOTE — DISCHARGE NOTE PROVIDER - NSDCMRMEDTOKEN_GEN_ALL_CORE_FT
famotidine 20 mg oral tablet: 1 tab(s) orally once a day  hydrOXYzine hydrochloride 10 mg oral tablet: 1 tab(s) orally once a day (at bedtime)  methocarbamol 750 mg oral tablet: 1 tab(s) orally 2 times a day as needed for  severe pain  Multiple Vitamins oral tablet: 1 tab(s) orally once a day  mupirocin 2% topical ointment: 1 application in each nostril 2 times a day Apply to both nostrils twice a day for 5 days, starting on 4/25/25.  sertraline 25 mg oral tablet: 1 tab(s) orally once a day   famotidine 20 mg oral tablet: 1 tab(s) orally once a day  hydrOXYzine hydrochloride 10 mg oral tablet: 1 tab(s) orally once a day (at bedtime)  methocarbamol 750 mg oral tablet: 1 tab(s) orally 2 times a day as needed for  severe pain  Multiple Vitamins oral tablet: 1 tab(s) orally once a day  polyethylene glycol 3350 oral powder for reconstitution: 17 gram(s) orally every 12 hours  sertraline 25 mg oral tablet: 1 tab(s) orally once a day  traMADol 25 mg oral tablet: 1 tab(s) orally every 6 hours as needed for  severe pain Take 1-2 tablets every 6 hours as needed for severe pain MDD: MDD: 4-8 tablets

## 2025-05-01 NOTE — PHYSICAL THERAPY INITIAL EVALUATION ADULT - ACTIVE RANGE OF MOTION EXAMINATION, REHAB EVAL
R shoulder unable, wrist/elbow WFL/Left UE Active ROM was WFL (within functional limits)/bilateral  lower extremity Active ROM was WFL (within functional limits)

## 2025-05-01 NOTE — DISCHARGE NOTE PROVIDER - NSDCFUADDINST_GEN_ALL_CORE_FT
Please call office at [phone] to make an appointment for follow up with neurosurgeon Dr. Blaine Lindsey' office in 1 week for wound check. You have dissolvable sutures which will not need to be removed. Ok to shower starting today but keep incision site covered with the clear tegaderm dressing until post-op day #3 (5/3/25). On day 3 you may remove the clear dressing and small piece of gauze. Underneath will be small white bandages (steri-strips), do NOT remove these. The steri-strips will fall off on their own or will be removed by Dr. Lindsey in 1 week at your follow-up appointment. When showering with the steri-strips on do not scrub incision site and do not submerge in water for prolonged period of time. Gently pat dry after shower.    Medications:  You may use ice packs and over the counter lidocaine patches for mild pain relief   Please take Tylenol or Ibuprofen (Advil, Aleve, etc.) as directed for mild pain (do not exceed 3200mg per day)    The following prescriptions were sent to your pharmacy:  - Tramadol 25mg every 6 hours for MODERATE to SEVERE pain   - Methocarbamol 500mg every 8 hours as needed for muscle spasms  **These medications can cause drowsiness, please do not operate any heavy machinery if taking these**    NO heavy lifting, strenuous activity, twisting, bending, driving, or working until cleared by your physician.  Return to ER immediately for any of the following: fever, bleeding, new onset numbness/tingling/weakness, nausea and/or vomiting, chest pain, shortness of breath, confusion, seizure, altered mental status, urinary and/or fecal incontinence or retention. Please call office at [phone] to make an appointment for follow up with neurosurgeon Dr. Blaine Lindsey' office in 1 week for wound check. You have dissolvable sutures which will not need to be removed. Ok to shower starting today but keep incision site covered with the clear tegaderm dressing until post-op day #3 (5/3/25). On day 3 you may remove the clear dressing and small piece of gauze. Underneath will be small white bandages (steri-strips), do NOT remove these. The steri-strips will fall off on their own or will be removed by Dr. Lindsey in 1 week at your follow-up appointment. When showering with the steri-strips on do not scrub incision site and do not submerge in water for prolonged period of time. Gently pat dry after shower.    Medications:  You may use ice packs and over the counter lidocaine patches for mild pain relief   Please take Tylenol or Ibuprofen (Advil, Aleve, etc.) as directed for mild pain (do not exceed 4000mg per day)      The following prescriptions were sent to your pharmacy:  - Tramadol 25mg every 6 hours for MODERATE to SEVERE pain   - Methocarbamol 500mg every 8 hours as needed for muscle spasms  **These medications can cause drowsiness, please do not operate any heavy machinery if taking these**    NO heavy lifting, strenuous activity, twisting, bending, driving, or working until cleared by your physician.  Return to ER immediately for any of the following: fever, bleeding, new onset numbness/tingling/weakness, nausea and/or vomiting, chest pain, shortness of breath, confusion, seizure, altered mental status, urinary and/or fecal incontinence or retention.

## 2025-05-01 NOTE — DISCHARGE NOTE PROVIDER - CARE PROVIDER_API CALL
Blaine Lindsey.  Neurosurgery  [address], [address]  [place] [address]  Phone: [phone]  Fax: [phone]  Established Patient  Follow Up Time: 1 week

## 2025-05-01 NOTE — PHYSICAL THERAPY INITIAL EVALUATION ADULT - ADDITIONAL COMMENTS
Pt AxOx4 states she lives at home with 2STE + landing + 4+ 5 steps with HR in split level home. Pt was independent PTA without use of DME, has partner at home to assist post d/c.

## 2025-05-01 NOTE — PROGRESS NOTE ADULT - ASSESSMENT
84y Female PMH GERD, Psoriasis, RA, admitted with weakness and numbness of right arm now s/p C4-C7 ACDF. POD#1    PLAN  - Continue neuro checks q4  - Decadron 4q6 x4 doses   - C collar at all times  - Pain control: tylenol, oxy 5/tramadol 50, robaxin 750q8  - Normotensive  - Incentive spirometry  - Regular diet  - LBM: 4/1  - Bowel regimen: senna, miralax  - Voiding  - GI ppx: protonix  - DVT ppx: SCDs  - Vanco while drain is in  - 1 SF KIRAN drain; possible removal today; output 60cc in 24hrs  - PT/OT to see today

## 2025-05-01 NOTE — PHYSICAL THERAPY INITIAL EVALUATION ADULT - PASSIVE RANGE OF MOTION EXAMINATION, REHAB EVAL
Left UE Passive ROM was WFL (within functional limits)/bilateral lower extremity Passive ROM was WFL (within functional limits)

## 2025-05-01 NOTE — PHYSICAL THERAPY INITIAL EVALUATION ADULT - PERTINENT HX OF CURRENT PROBLEM, REHAB EVAL
85 yo F PMH GERD, psoriasis, RA presented to same day surgery with right shoulder, right arm pain numbness down her arm to her hand and weakness. She denies any injury, Patient states she lost use of her right arm and is unable to adequately  a cup in her right hand.atient went to OR for C4-C7 Anterior Cervical Discectomy fusion with Dr Lindsey on 4/30/25.

## 2025-05-01 NOTE — DISCHARGE NOTE PROVIDER - HOSPITAL COURSE
Ms. Moeller is an 83 yo F with PMH significant for GERD, psoriasis and RA who presented to same day surgery with right shoulder, right arm pain numbness down her arm to her hand and weakness for a scheduled C4-5, C5-6, C6-7 ACDF with Dr Lindsey. She denies any injury, but states she lost use of her right arm and is unable to adequately  a cup in her right (dominant) hand. She is now POD#1 from C4-5, C5-6, C6-7 ACDF and her drain has been removed, she is voiding, had a bowel movement, pain is controlled, is tolerating a diet, denies difficulty swallowing, HA, weakness, numbness, CP, SOB and has been cleared by PT/OT for d/c to home. Ms. Moeller is an 85 yo F with PMH significant for GERD, psoriasis and RA who presented to same day surgery with right shoulder, right arm pain numbness down her arm to her hand and weakness for a scheduled C4-5, C5-6, C6-7 ACDF with Dr Lindsey on 4/30/2025. She denies any injury, but states she lost use of her right arm and is unable to adequately  a cup in her right (dominant) hand. She is now POD#2 from C4-5, C5-6, C6-7 ACDF and her drain has been removed, she is voiding, had a bowel movement, pain is controlled, is tolerating a diet, denies difficulty swallowing, HA, weakness, numbness, CP, SOB and has been cleared by PT/OT for d/c to home.

## 2025-05-01 NOTE — OCCUPATIONAL THERAPY INITIAL EVALUATION ADULT - PERTINENT HX OF CURRENT PROBLEM, REHAB EVAL
As per MD note: 84y Female s/p C4-C7 ACDF POD#1 seen lying comfortably in bed with cervical collar on. Endorses 6/10 pain but feels well controlled. Pt is agreeable to go home today if PT clears and drain is removed. Denies any swallowing difficulty. Tolerating diet. LBM 4/1. Voiding. Denies headache, weakness, numbness, n/v/d, fevers, chills, chest pain, SOB.

## 2025-05-01 NOTE — OCCUPATIONAL THERAPY INITIAL EVALUATION ADULT - RANGE OF MOTION EXAMINATION, UPPER EXTREMITY
except to right shoulder and scapula- moderate deficits./bilateral UE Active ROM was WFL  (within functional limits)

## 2025-05-01 NOTE — DISCHARGE NOTE PROVIDER - NSDCFUSCHEDAPPT_GEN_ALL_CORE_FT
[facility]  [facility] [address]   Scheduled Appointment: 07/15/2025

## 2025-05-01 NOTE — PROGRESS NOTE ADULT - SUBJECTIVE AND OBJECTIVE BOX
HPI:  83 yo F PMH GERD, psoriasis, RA presented to same day surgery with right shoulder, right arm pain numbness down her arm to her hand and weakness. She denies any injury, Patient states she lost use of her right arm and is unable to adequately  a cup in her right hand.  Patient went to OR for C4-C7 Anterior Cervical Discectomy fusion with Dr Lindsey on 4/30/25. Now POD#1.     INTERVAL HPI/OVERNIGHT EVENTS:  84y Female s/p C4-C7 ACDF POD#1 seen lying comfortably in bed with cervical collar on. Endorses 6/10 pain but feels well controlled. Pt is agreeable to go home today if PT clears and drain is removed. Denies any swallowing difficulty. Tolerating diet. LBM 4/1. Voiding. Denies headache, weakness, numbness, n/v/d, fevers, chills, chest pain, SOB.     Vital Signs Last 24 Hrs  T(C): 36.5 (01 May 2025 04:00), Max: 36.9 (01 May 2025 00:00)  T(F): 97.7 (01 May 2025 04:00), Max: 98.4 (01 May 2025 00:00)  HR: 71 (01 May 2025 04:00) (59 - 76)  BP: 128/78 (01 May 2025 04:00) (113/75 - 150/105)  BP(mean): 108 (30 Apr 2025 13:30) (91 - 120)  RR: 18 (01 May 2025 04:00) (12 - 20)  SpO2: 91% (01 May 2025 04:00) (91% - 100%)    Parameters below as of 01 May 2025 04:00  Patient On (Oxygen Delivery Method): room air    PHYSICAL EXAM:  GENERAL: NAD  DRAINS: 1 Subfascial drain to bulb suction.   NECK: C-collar in place.  WOUND: Dressing clean dry intact  MENTAL STATUS: AAO x3; Opens eyes spontaneously; Appropriately conversant without aphasia; following commands  CRANIAL NERVES: PERRL. EOMI without nystagmus. Facial sensation intact V1-3 distribution b/l. Face symmetric w/ normal eye closure and smile  MOTOR: Improved decreased sensation on RUE  Uppers     Delt (C5/6)     Bicep (C5/6)        Tricep (C7)     HG (C8/T1)  R                     4/5                 4/5              4/5                   5/5  L                      5/5                 5/5              5/5                   5/5  Lowers      HF(L1/L2)     KE (L3)     DF (L4)          PF (S1)      R                     5/5              5/5           5/5           5/5              L                     5/5               5/5          5/5            5/5              CHEST/LUNG: b/l chest rise, normal WOB  SKIN: Warm, dry    LABS:    04-30 @ 07:01  -  05-01 @ 07:00  --------------------------------------------------------  IN: 0 mL / OUT: 960 mL / NET: -960 mL

## 2025-05-02 ENCOUNTER — TRANSCRIPTION ENCOUNTER (OUTPATIENT)
Age: 85
End: 2025-05-02

## 2025-05-02 VITALS
TEMPERATURE: 98 F | OXYGEN SATURATION: 96 % | DIASTOLIC BLOOD PRESSURE: 70 MMHG | RESPIRATION RATE: 17 BRPM | SYSTOLIC BLOOD PRESSURE: 127 MMHG | HEART RATE: 69 BPM

## 2025-05-02 PROCEDURE — 76000 FLUOROSCOPY <1 HR PHYS/QHP: CPT

## 2025-05-02 PROCEDURE — C1889: CPT

## 2025-05-02 PROCEDURE — 36415 COLL VENOUS BLD VENIPUNCTURE: CPT

## 2025-05-02 PROCEDURE — 97167 OT EVAL HIGH COMPLEX 60 MIN: CPT

## 2025-05-02 PROCEDURE — C9399: CPT

## 2025-05-02 PROCEDURE — C1713: CPT

## 2025-05-02 PROCEDURE — 82962 GLUCOSE BLOOD TEST: CPT

## 2025-05-02 RX ORDER — TRAMADOL HYDROCHLORIDE 50 MG/1
1 TABLET, FILM COATED ORAL
Qty: 12 | Refills: 0
Start: 2025-05-02 | End: 2025-05-04

## 2025-05-02 RX ORDER — METHOCARBAMOL 500 MG/1
2 TABLET, FILM COATED ORAL
Qty: 84 | Refills: 0
Start: 2025-05-02 | End: 2025-05-15

## 2025-05-02 RX ORDER — POLYETHYLENE GLYCOL 3350 17 G/17G
17 POWDER, FOR SOLUTION ORAL
Qty: 0 | Refills: 0 | DISCHARGE
Start: 2025-05-02

## 2025-05-02 RX ADMIN — METHOCARBAMOL 750 MILLIGRAM(S): 500 TABLET, FILM COATED ORAL at 05:05

## 2025-05-02 RX ADMIN — Medication 3 MILLILITER(S): at 05:00

## 2025-05-02 RX ADMIN — POLYETHYLENE GLYCOL 3350 17 GRAM(S): 17 POWDER, FOR SOLUTION ORAL at 05:13

## 2025-05-02 NOTE — DISCHARGE NOTE NURSING/CASE MANAGEMENT/SOCIAL WORK - PATIENT PORTAL LINK FT
You can access the FollowMyHealth Patient Portal offered by [facility] by registering at the following website: [email or web address]. By joining [employer] [employer]’s FollowMyHealth portal, you will also be able to view your health information using other applications (apps) compatible with our system.

## 2025-05-02 NOTE — DISCHARGE NOTE NURSING/CASE MANAGEMENT/SOCIAL WORK - FINANCIAL ASSISTANCE
[facility] provides services at a reduced cost to those who are determined to be eligible through [facility]’s financial assistance program. Information regarding [facility]’s financial assistance program can be found by going to [email or web address] or by calling [phone].

## 2025-05-02 NOTE — PROGRESS NOTE ADULT - SUBJECTIVE AND OBJECTIVE BOX
HPI: 85 yo F PMH GERD, psoriasis, RA presented to same day surgery with right shoulder, right arm pain numbness down her arm to her hand and weakness. She denies any injury, Patient states she lost use of her right arm and is unable to adequately  a cup in her right hand.  Patient went to OR for C4-C7 Anterior Cervical Discectomy fusion with Dr Lindsey on 4/30/25. Now POD#2.     OVERNIGHT EVENTS: AILYN, neuro stable and hemodynamically stable for discharge. KIRAN drain removed.     Vital Signs Last 24 Hrs  T(C): 36.4 (02 May 2025 08:41), Max: 36.8 (01 May 2025 10:01)  T(F): 97.5 (02 May 2025 08:41), Max: 98.3 (01 May 2025 10:01)  HR: 69 (02 May 2025 08:41) (60 - 71)  BP: 127/70 (02 May 2025 08:41) (127/70 - 149/87)  BP(mean): --  RR: 17 (02 May 2025 08:41) (16 - 18)  SpO2: 96% (02 May 2025 08:41) (93% - 96%)    Parameters below as of 02 May 2025 08:41  Patient On (Oxygen Delivery Method): room air        I&O's Summary    01 May 2025 07:01  -  02 May 2025 07:00  --------------------------------------------------------  IN: 0 mL / OUT: 50 mL / NET: -50 mL        PHYSICAL EXAM:  General: patient seen laying supine in bed in NAD  Neuro: AAOx3, FC, OE spontaneously, speech clear and fluent, CNII-XI grossly intact, face symmetric, no pronator drift, finger to nose intact, strength 4/5 RUE delts/bis/tri 5/5 HG otherwise 5/5 LUE and b/l LE, sensation intact to light touch throughout  HEENT: PERRL, EOMI  Neck: supple  Cardiac: RRR, S1S2  Pulmonary: chest rise symmetric  Abdomen: soft, nontender, nondistended  Ext: perfusing well  Skin: warm, dry  Wound: Anterior ACDF incision +steri-strips, drain removal site w/ gauze and tegaderm          CAPILLARY BLOOD GLUCOSE          Drug Levels: [] N/A    CSF Analysis: [] N/A      Allergies    Voltaren (Stomach Upset; Diarrhea)  Cipro (Unknown)  penicillin (Hives; Angioedema)  Tolerates cefazolin (Other)    Intolerances    Cipro (Diarrhea)    MEDICATIONS:  Antibiotics:  vancomycin  IVPB 1000 milliGRAM(s) IV Intermittent once    Neuro:  hydrOXYzine hydrochloride 10 milliGRAM(s) Oral daily  methocarbamol 750 milliGRAM(s) Oral every 8 hours  oxyCODONE    IR 5 milliGRAM(s) Oral every 6 hours PRN  sertraline 25 milliGRAM(s) Oral daily  traMADol 50 milliGRAM(s) Oral every 6 hours PRN    Anticoagulation:  enoxaparin Injectable 40 milliGRAM(s) SubCutaneous every 24 hours    OTHER:  benzocaine/menthol Lozenge 1 Lozenge Oral every 2 hours  chlorhexidine 2% Cloths 1 Application(s) Topical daily  famotidine    Tablet 20 milliGRAM(s) Oral daily  polyethylene glycol 3350 17 Gram(s) Oral every 12 hours  senna 2 Tablet(s) Oral at bedtime    IVF:  sodium chloride 0.9% lock flush 3 milliLiter(s) IV Push every 8 hours      ASSESSMENT:  84y Female PMH GERD, Psoriasis, RA, admitted with weakness and numbness of right arm now s/p C4-C7 ACDF. POD#2.    PLAN:  - Continue neuro checks q4  - Decadron 4q6 x4 doses   - C collar at all times  - Pain control: tylenol, oxy 5/tramadol 50, robaxin 750q8  - Normotensive  - Incentive spirometry  - Regular diet  - LBM: 4/1  - Bowel regimen: senna, miralax  - Voiding  - GI ppx: protonix  - DVT ppx: SCDs  - Vanco dc'd  - 1 SF KIRAN drain removed today   - PT/OT clear for home no needs  D/w Dr. Lindsey

## 2025-05-27 ENCOUNTER — APPOINTMENT (OUTPATIENT)
Dept: PAIN MANAGEMENT | Facility: CLINIC | Age: 85
End: 2025-05-27

## 2025-06-06 ENCOUNTER — APPOINTMENT (OUTPATIENT)
Dept: ORTHOPEDIC SURGERY | Facility: CLINIC | Age: 85
End: 2025-06-06
Payer: MEDICARE

## 2025-06-06 PROCEDURE — 99213 OFFICE O/P EST LOW 20 MIN: CPT | Mod: 25

## 2025-06-06 PROCEDURE — 20600 DRAIN/INJ JOINT/BURSA W/O US: CPT | Mod: RT

## 2025-06-12 ENCOUNTER — APPOINTMENT (OUTPATIENT)
Dept: ORTHOPEDIC SURGERY | Facility: CLINIC | Age: 85
End: 2025-06-12
Payer: MEDICARE

## 2025-06-12 VITALS — HEIGHT: 66 IN | WEIGHT: 154 LBS | BODY MASS INDEX: 24.75 KG/M2

## 2025-06-12 PROCEDURE — J3490M: CUSTOM | Mod: JZ

## 2025-06-12 PROCEDURE — 20610 DRAIN/INJ JOINT/BURSA W/O US: CPT | Mod: RT

## 2025-06-12 PROCEDURE — 99214 OFFICE O/P EST MOD 30 MIN: CPT | Mod: 25

## 2025-06-12 RX ORDER — MELOXICAM 15 MG/1
15 TABLET ORAL DAILY
Qty: 30 | Refills: 1 | Status: ACTIVE | COMMUNITY
Start: 2025-06-12 | End: 2025-08-11

## 2025-07-15 ENCOUNTER — APPOINTMENT (OUTPATIENT)
Dept: PAIN MANAGEMENT | Facility: CLINIC | Age: 85
End: 2025-07-15
Payer: MEDICARE

## 2025-07-15 VITALS — WEIGHT: 157 LBS | BODY MASS INDEX: 25.23 KG/M2 | HEIGHT: 66 IN

## 2025-07-15 PROBLEM — M96.1 CERVICAL POSTLAMINECTOMY SYNDROME: Status: ACTIVE | Noted: 2025-07-15

## 2025-07-15 PROCEDURE — 99214 OFFICE O/P EST MOD 30 MIN: CPT

## 2025-08-01 ENCOUNTER — APPOINTMENT (OUTPATIENT)
Dept: ORTHOPEDIC SURGERY | Facility: CLINIC | Age: 85
End: 2025-08-01
Payer: MEDICARE

## 2025-08-01 DIAGNOSIS — M19.042 PRIMARY OSTEOARTHRITIS, LEFT HAND: ICD-10-CM

## 2025-08-01 DIAGNOSIS — M19.041 PRIMARY OSTEOARTHRITIS, RIGHT HAND: ICD-10-CM

## 2025-08-01 PROCEDURE — 99213 OFFICE O/P EST LOW 20 MIN: CPT | Mod: 25

## 2025-08-01 PROCEDURE — 20600 DRAIN/INJ JOINT/BURSA W/O US: CPT | Mod: RT

## 2025-08-01 RX ORDER — MELOXICAM 7.5 MG/1
7.5 TABLET ORAL DAILY
Qty: 15 | Refills: 0 | Status: ACTIVE | COMMUNITY
Start: 2025-08-01 | End: 1900-01-01

## 2025-08-13 ENCOUNTER — APPOINTMENT (OUTPATIENT)
Dept: PAIN MANAGEMENT | Facility: CLINIC | Age: 85
End: 2025-08-13

## 2025-09-02 ENCOUNTER — APPOINTMENT (OUTPATIENT)
Dept: PAIN MANAGEMENT | Facility: CLINIC | Age: 85
End: 2025-09-02

## 2025-09-04 ENCOUNTER — APPOINTMENT (OUTPATIENT)
Dept: ORTHOPEDIC SURGERY | Facility: CLINIC | Age: 85
End: 2025-09-04
Payer: MEDICARE

## 2025-09-04 DIAGNOSIS — S82.009A UNSPECIFIED FRACTURE OF UNSPECIFIED PATELLA, INITIAL ENCOUNTER FOR CLOSED FRACTURE: ICD-10-CM

## 2025-09-04 DIAGNOSIS — S82.035A NONDISPLACED TRANSVERSE FRACTURE OF LEFT PATELLA, INITIAL ENCOUNTER FOR CLOSED FRACTURE: ICD-10-CM

## 2025-09-04 PROCEDURE — 73564 X-RAY EXAM KNEE 4 OR MORE: CPT | Mod: LT

## 2025-09-04 PROCEDURE — 99214 OFFICE O/P EST MOD 30 MIN: CPT

## 2025-09-04 PROCEDURE — L1845: CPT | Mod: LT

## 2025-09-15 ENCOUNTER — APPOINTMENT (OUTPATIENT)
Dept: ORTHOPEDIC SURGERY | Facility: CLINIC | Age: 85
End: 2025-09-15
Payer: MEDICARE

## 2025-09-15 VITALS — BODY MASS INDEX: 24.11 KG/M2 | HEIGHT: 66 IN | WEIGHT: 150 LBS

## 2025-09-15 DIAGNOSIS — M19.042 PRIMARY OSTEOARTHRITIS, LEFT HAND: ICD-10-CM

## 2025-09-15 DIAGNOSIS — M19.041 PRIMARY OSTEOARTHRITIS, RIGHT HAND: ICD-10-CM

## 2025-09-15 DIAGNOSIS — M15.2 BOUCHARD'S NODES (WITH ARTHROPATHY): ICD-10-CM

## 2025-09-15 PROCEDURE — 99213 OFFICE O/P EST LOW 20 MIN: CPT | Mod: 25

## 2025-09-15 PROCEDURE — 20600 DRAIN/INJ JOINT/BURSA W/O US: CPT

## 2025-09-19 ENCOUNTER — APPOINTMENT (OUTPATIENT)
Dept: ORTHOPEDIC SURGERY | Facility: CLINIC | Age: 85
End: 2025-09-19
Payer: MEDICARE

## 2025-09-19 VITALS — WEIGHT: 150 LBS | HEIGHT: 66 IN | BODY MASS INDEX: 24.11 KG/M2

## 2025-09-19 DIAGNOSIS — S82.035D NONDISPLACED TRANSVERSE FRACTURE OF LEFT PATELLA, SUBSEQUENT ENCOUNTER FOR CLOSED FRACTURE WITH ROUTINE HEALING: ICD-10-CM

## 2025-09-19 DIAGNOSIS — M17.11 UNILATERAL PRIMARY OSTEOARTHRITIS, RIGHT KNEE: ICD-10-CM

## 2025-09-19 PROCEDURE — 73560 X-RAY EXAM OF KNEE 1 OR 2: CPT | Mod: LT

## 2025-09-19 PROCEDURE — 99214 OFFICE O/P EST MOD 30 MIN: CPT | Mod: 25

## (undated) DEVICE — DRSG STOCKINETTE TUBULAR COTTON 1PLY 6X72"

## (undated) DEVICE — MIDAS REX MR8 MATCH HEAD FLUTED LG BORE 3MM X 14CM

## (undated) DEVICE — SUT VICRYL 2-0 36" CT-1 UNDYED

## (undated) DEVICE — WARMING BLANKET LOWER ADULT

## (undated) DEVICE — DRAPE XL SHEET 77X98"

## (undated) DEVICE — DRAPE TOWEL BLUE 17" X 24"

## (undated) DEVICE — SAW BLADE STRYKER SAGITTAL 24.7X0.89X73.7MM

## (undated) DEVICE — ZIMMER PULSAVAC PLUS FAN KIT

## (undated) DEVICE — STAPLER SKIN PROXIMATE

## (undated) DEVICE — PREP BETADINE KIT

## (undated) DEVICE — SPONGE PEANUT AUTO COUNT

## (undated) DEVICE — Device

## (undated) DEVICE — DRAPE C ARM UNIVERSAL

## (undated) DEVICE — PREP DURAPREP 26CC

## (undated) DEVICE — SUT MONOCRYL 3-0 27" PS-2 UNDYED

## (undated) DEVICE — PACK TOTAL JOINT

## (undated) DEVICE — DRSG MEPILEX 10 X 10CM (4 X 4") AG

## (undated) DEVICE — PACK NEURO

## (undated) DEVICE — DRAPE SPLIT SHEET 77" X 108"

## (undated) DEVICE — FRA-ESU BOVIE FORCE TRIAD T6D04558DX: Type: DURABLE MEDICAL EQUIPMENT

## (undated) DEVICE — TAPE SILK 3"

## (undated) DEVICE — GLV 8 PROTEXIS (WHITE)

## (undated) DEVICE — MARKING PEN W RULER

## (undated) DEVICE — DRAPE INSTRUMENT POUCH 6.75" X 11"

## (undated) DEVICE — DRILL K2M MEDICAL 2.3X12MM

## (undated) DEVICE — DRAPE 3/4 SHEET W REINFORCEMENT 56X77"

## (undated) DEVICE — SOL IRR POUR H2O 1000ML

## (undated) DEVICE — SUT SILK 3-0 30" TIES

## (undated) DEVICE — ELCTR GROUNDING PAD ADULT COVIDIEN

## (undated) DEVICE — SYR LUER LOK 20CC

## (undated) DEVICE — DRAIN JACKSON PRATT 7MM FLAT FULL W 15 FR TROCAR

## (undated) DEVICE — ELCTR MONOPOLAR STIMULATOR PROBE FLUSH-TIP

## (undated) DEVICE — ELCTR BOVIE TIP BLADE INSULATED 6.5" EDGE

## (undated) DEVICE — NDL HYPO SAFE 20G X 1.5" (YELLOW)

## (undated) DEVICE — SUCTION YANKAUER TAPERED BULBOUS NO VENT

## (undated) DEVICE — DRILL BIT S&N ACETABULAR 25MM

## (undated) DEVICE — POSITIONER FOAM ABDUCTION PILLOW MED (PINK)

## (undated) DEVICE — SUT ETHIBOND 5 4-30" V-37

## (undated) DEVICE — NDL SPINAL 18G X 3.5" (PINK)

## (undated) DEVICE — ELCTR SUBDERMAL NDL CLASSIC 1.5M X 59" (6 COLOR)

## (undated) DEVICE — ELCTR PEDICLE SCREW PROBE 3MM BALL 1.8MM X 100MM

## (undated) DEVICE — SUT VICRYL 1 36" CTX UNDYED

## (undated) DEVICE — DRAPE TOWEL 1000 SMALL 17" X 11"

## (undated) DEVICE — TUBING FOR SMOKE EVACUATOR (PURPLE END)

## (undated) DEVICE — DRSG PICO NPWT 4X12"

## (undated) DEVICE — FOLEY TRAY 16FR LF URINE METER SURESTEP

## (undated) DEVICE — SOL IRR BAG NS 0.9% 3000ML

## (undated) DEVICE — DRAPE STERI-DRAPE INCISE 32X33"

## (undated) DEVICE — DRAPE IRRIGATION POUCH 19X23"

## (undated) DEVICE — PREP CHLORAPREP HI-LITE ORANGE 26ML

## (undated) DEVICE — HOOD T5 PEELAWAY

## (undated) DEVICE — SOL IRR POUR NS 0.9% 1000ML

## (undated) DEVICE — DRSG STERISTRIPS 0.5 X 4"

## (undated) DEVICE — ELCTR ROCKER SWITCH PENCIL BLUE 10FT

## (undated) DEVICE — GLV 7.5 PROTEXIS (WHITE)

## (undated) DEVICE — TUBING BIPOLAR IRRIGATOR AND CORD SET

## (undated) DEVICE — DRSG MASTISOL

## (undated) DEVICE — DRSG 2X2

## (undated) DEVICE — DRSG TEGADERM 4 X 4.75"

## (undated) DEVICE — PACK BASIC

## (undated) DEVICE — SUT STRATAFIX SPIRAL MONOCRYL PLUS 4-0 30CM PS-2 UNDYED

## (undated) DEVICE — SUT VICRYL 3-0 36" CT-1

## (undated) DEVICE — PRESSURE INFUSOR BAG 1000ML

## (undated) DEVICE — GOWN XXL

## (undated) DEVICE — POSITIONER FOAM EGG CRATE ULNAR 2PCS (PINK)

## (undated) DEVICE — VENODYNE/SCD SLEEVE CALF MEDIUM

## (undated) DEVICE — ELCTR BIPOLAR PROBE

## (undated) DEVICE — DRAPE HIP W POUCHES 87X115X134"

## (undated) DEVICE — DRAPE STERI-DRAPE INCISE 19X17"

## (undated) DEVICE — ELCTR SUBDERMAL NDL 27G X 1/2" WITH TWISTED PAIR

## (undated) DEVICE — BLADE SURGICAL #11 CARBON

## (undated) DEVICE — DRAPE 1/2 SHEET 40X57"

## (undated) DEVICE — SUT MONOCRYL 4-0 27" PS-2 UNDYED

## (undated) DEVICE — SUT VICRYL PLUS 2-0 18" CP-2 UNDYED (POP-OFF)